# Patient Record
Sex: MALE | Race: WHITE
[De-identification: names, ages, dates, MRNs, and addresses within clinical notes are randomized per-mention and may not be internally consistent; named-entity substitution may affect disease eponyms.]

---

## 2017-06-13 NOTE — HEMODYNAMI
PATIENT:LELA ORTIZ                                    MEDICAL RECORD: [id number]
: 51                                            LOCATION:[facility]     
[id number]                                       ADMISSION DATE: 17
 
 
 Generatedon:201712:32
Patient name: LELA ORTIZ Patient #: [id number] Visit #: [id number] SSN: :
1955 Date of study: 2017
Page: Of
Hemodynamic Procedure Report
****************************
Patient Data
Patient Demographics
Procedure consent was obtained
First Name: LELA          Gender: Male
Last Name: DIANA             : 1955
Middle Initial: M           Age: 62 year(s)
Patient #: [id number]       Race: Unknown
Visit #: [id number]
Accession #:
[id number]
Additional ID: [id number]
Contact details
Address: [address] [place] [address]       Phone: [phone]
State: [place]
City: [facility]
Zip code: [address]
Past Medical History
Allergies: No known allergies
Admission
Admission Data
Admission Date: 2017   Admission Time: 7:15
Room #: D.2118
Lab Results
Lab Result Date: 2017  Lab Result Time: 4:35
Biochemistry
Name         Units    Result                Min      Max
BUN          mg/dl    19       --(----)*-   7        18
Creatinine   mg/dl    0.8      --(-*--)--   0.6      1.3
CBC
Name         Units    Result                Min      Max
Hematocrit   %        32.4     *-(----)--   42       54
Hemoglobin   g/dl     10.9     *-(----)--   13.5     17.5
Procedure
Procedure Types
Cath Procedure
Diagnostic Procedure
DROA
Procedure Description
Procedure Date
Procedure Date: 2017
Procedure Start Time: 12:20
Procedure End Time: 12:32
Procedure Staff
Name                            Function
Prabhjot Adames MD                Performing Physician
Ned Griffith RN                  Nurse
Kodak Patel RN                Nurse
Jamilah Alexander RT             Monitor
 
Procedure Medications
Medication           Administration Route Dosage
Oxygen               NC                   2 l/min
Hurricaine [place]     P.O.                 1 Sprays
Refer to Anesthesia
Notes for Sedation
Medications
Hemodynamics
Rest
Heart Rate: 94 (bpm)
Snapshots
Pre Cath      Intra         NCS           Post Cath
Vital Signs
Time     Heart  Resp   SPO2 NIBP (mmHg) Rhythm  Pain    Sedation
Rate   (ipm)  (%)                      Status  Level
(bpm)
12:06:21 102    19     97   162/87(121) NSR     0 (11)  10(A)
, No
pain
12:10:35 89     18     96   137/78(105) NSR     0 (11)  10(A)
, No
pain
12:14:51 89     18     96   147/81(111) NSR     0 (11)  10(A)
, No
pain
12:19:05 97     16     98   154/89(122) NSR     0 (11)  10(A)
, No
pain
12:23:25 74     17     98   135/68(104) NSR     0 (11)  9(A)
, No
pain
12:27:41 82     18     99   133/74(97)  NSR     0 (11)  8(A)
, No
pain
12:31:02 94     19     99   127/78(105) NSR     0 (11)  10(A)
, No
pain
Medications
Time     Medication  Route  Dose   Verified Delivered Reason    Notes  Effective
ness
by       by
12:21:44 Oxygen      NC     2      Prabhjot Marino    used for
l/min  Rosangela Griffith RN   procedure
12:21:54 Hurricaine  P.O.   1      Prabhjot Marino    Per
Spray              Sprays Rosangela Griffith RN   physician
12:21:58 Refer to                  Prabhjot Marino
Anesthesia                Rosangela Griffith RN
Notes for
Sedation
Medications
Procedure Log
Time     Note
11:49:27 Kodak Patel RN sent for patient. Start room use.
11:49:28 Time tracking: Regular hours
11:49:34 Plan of Care:Hemodynamics will remain stable., Cardiac
rhythm will remain stable., Comfort level will be
maintained., Respiratory function will remain
adequate., Patient/ family verbilizes understanding of
procedure., Procedure tolerated without complication.,
Recovers from procedure without complications..
 
12:01:53 Patient arrived from PCU to CCL 2. Patient remains on
bed/stretcher for procedure.
12:01:54 Warm blankets applied, and demetris hugger turned on for
patient comfort.
12:01:55 Correct patient and procedure confirmed by team.
12:01:56 Signed procedure consent form obtained from patient.
12:01:57 ECG and BP/O2 sat monitors applied to patient.
12:01:58 Full Disclosure recording started
12:05:11 Vital chart was started
12:05:16 Rhythm: sinus rhythm
12:06:27 H&P Date Dictated: 2017 Within 30 days and on
chart., H&P Addendum completed by physician on day of
procedure. (MUST COMPLETE FOR ALL OUTPATIENTS).
12:06:28 Pre-procedure instructions explained to patient.
12:06:29 Pre-op teaching completed and patient verbalized
understanding.
12:06:30 Family in patients room.
12:06:36 Patient NPO since Midnight.
12:07:30 Is patient on blood thinner?No
12:07:31 Patient diabetic? Yes.
12:07:32 If diabetic: On Metformin? No
12:07:45 Previous problem with sedation/anesthesia? No ?
12:07:47 Snore? Yes
12:07:48 Sleep apnea? No
12:07:49 Deviated septum? No
12:07:50 Opens mouth fully? Yes
12:07:50 Sticks out tongue? Yes
12:07:53 Airway obstruction? No ?
12:07:54 Dentures? No ?
12:07:59 Patient pain scale 0/10 ?.
12:08:07 IV patent on arrival in left hand with 0.9% NaCl at
KVO.
12:08:13 Lab results completed and on chart.
12:08:16 Alarms reviewed by R. N.
12:08:16 Sharps counted by scrub and verified by R.N.
12:11:28 Navi Pierre Echo Tech present for DORA.
12:11:49 Baseline sample Acquired.
12:19:26 Final Timeout: patient, procedure, and site verified
with staff and physician. All members of the team are
in agreement.
12:19:32 Physical assessment completed. ASA score P 2 - A
patient with mild systemic disease as per Prabhjot Adames MD.
12:19:37 Sedation plan: TIVA Versed, Fentanyl
12:20:05 Dr Angulo present and monitoring patient for TIVA.
12:21:44 Oxygen 2 l/min NC was administered by Ned Griffith RN;
used for procedure;
12:21:54 Hurricaine Spray 1 Sprays P.O. was administered by
Ned Griffith RN; Per physician;
12:21:58 Refer to Anesthesia Notes for Sedation Medications was
administered by Ned Griffith RN; ;
12:22:45 DORA started.
12:26:22 DORA completed.
12:26:54 Procedure ended.(Physican Out)
12:28:09 Post-procedure physical assessment completed. ASA
score P 2 - A patient with mild systemic disease as
per Prabhjot Adames MD.
12:28:11 Post procedure rhythm: unchanged.
12:28:13 Post procedure instruction explained to
patient.Patient verbalizes understanding.
 
12:28:14 Patient needs reinforcement of post procedure
teaching.
12:28:29 See physician's report for complete and final results.
12:32:19 Vital chart was stopped
12:32:22 Report given to PCU.
12:32:26 Patient transfered to PCU with Bed.
12:32:34 Procedure ended.
12:32:34 Full Disclosure recording stopped
12:32:38 End room use (Document Last)
Signature Audit [place]
Stage           Time        Signature      Unsigned
Intra-Procedure 2017   Jamilah
12:32:47 PM Counts RT(R)
Signatures
Monitor : Jamilah     Signature :
Counts RT               ______________________________
Date : ______________ Time :
______________
 
 
 
 
 
 
 
 
 
 
 
 
 
 
 
 
 
 
 
 
 
 
 
 
 
 
 
 
 
 
 
 
 
 
 
 
 
[id number] [place] [address]

## 2017-06-13 NOTE — NUR
IV FLUIDS OF NS INITIATED @ 125 CC/HR VIA PUMP PER ORDER. FAMILY IN ROOM.
WAITING ON INSULIN FROM PHARMACY TO COVER BLOOD SUGAR. WILL CALL NOW.

## 2017-06-13 NOTE — NUR
GLUCOMETER READING 421. STAT GLUCOSE LEVEL PUT IN COMPUTER. WILL SPEAK WITH
DR. CRUZ ABOUT DOING I&O CATH FOR UA.

## 2017-06-13 NOTE — NUR
IV TO LEFT AC WAS OUT WITH TIP INTACT. RESITED TO LEFT WRIST WITH 22 GA X1
STICK PER [facility]. ORAL MEDS ADMINISTERED. FSBS 395 SO 16 UNITS REGULAR
INSULIN SUBQ TO LEFT ARM. LOVENIX SUBQ TO LLQ. DAUGHTER AT BEDSIDE. CALL LIGHT
IN REACH. BED ALARM ON.

## 2017-06-13 NOTE — TEE
PATIENT:LELA ORTIZ                     MEDICAL RECORD: [id number]
                                               LOCATION:D.     D.211
AGE OF PATIENT: 62                             ADMISSION DATE: 06/13/17
SEX: M   
 
REFERRING PHYSICIAN:                                                             
 
INTERPRETING PHYSICIAN: CARI ADAMES MD             

 
 
                         TRANSESOPHAGEAL ECHOCARDIOGRAM
                 DORA CHARGE  Y
                INDICATIONS: STAPH IN BLOOD, ASSESS   
                             FOR VEGATAIONN           
             PREMEDICATIONS:                               
 
PATIENT'S RESPONSE PROCEDURE                          
 
                     DOPPLER MEASUREMENTS:
            LVIT            LA           PA           RA     
            LVOT 133      RVOT      Asc. Ao 128 
AV Gradient Peak 6.6   AV Mean 2.7  AV Area 3.1 
MV Gradient Peak 4.5   MV Mean 1.6  MV Area     
 INTERPRETATION:                          
 
 
 
 
               Doppler:                          
 
                   2-D: VEGATATION NOTED ON      
                        TRICUSPID VALVE          
     COLOR FLOW DOPPLER                          
 
   NORMAL SALINE STUDY:                     
 
          MISCELLANOUS:                          
 
             DIAGNOSIS:                          
 
                  PLAN:                          
 
           Cardiologist:1          Dr. Adames                
   Cardiac Sonographer: Yanet BROWN              
              COMMENTS:                                              
                                                                                     
 
 
TRANSESOPHAGEAL ECHOCARDIOGRAPHIC EVALUATION OF VALVULAR HEART DISEASE 
 
FINDINGS:  
1. Left ventricular chamber size is within normal limits.  Left ventricular systolic
function is normal.  Overall ejection fraction is estimated at 50 percent.
2. Left atrium, right atrium, and right ventricular chamber sizes are mildly dilated.
 The left atrium measures 4.1 centimeters.  
3. Valvular structures:  There is a definite vegetation on the tricuspid valve
compatible with endocarditis.  The remaining valvular structures have normal
 
 
 
TRANSESOPHAGEAL ECHOCARDIOGRAM REPORT                    [id number]    LELA ORTIZ   
 
 
structure and motion.
4. Doppler interrogation reveals moderate tricuspid regurgitation; no other valvular
insufficiency or stenosis.  
5. No evidence of pericardial effusion or left ventricular thrombus.   
 
OVERALL IMPRESSION:  Tricuspid valve endocarditis.  
                                           
                                           CARI ADAMES MD             
 
 
 
 
 
 
 
 
 
 
 
 
 
 
 
 
 
 
 
 
 
 
 
 
 
 
 
 
 
 
 
 
 
 
 
 
 
 
CC:                                                             [id number]
DICTATION DATE: 06/27/17 1219     : TC  07/01/17 1217      DIS IN  
                                                                      06/29/17
[id number] [place] [address]

## 2017-06-13 NOTE — HEMODYNAMI
PATIENT:LELA ORTIZ                                    MEDICAL RECORD: [id number]
: 51                                            LOCATION:[facility]     D.2118
[id number]                                       ADMISSION DATE: 17
 
 
 Generatedon:201711:08
Patient name: LELA ORTIZ Patient #: [id number] Visit #: [id number] SSN: : 
1955 Date of
study: 2017
Page: Of
Hemodynamic Procedure Report
****************************
Patient Data
Patient Demographics
Procedure consent was obtained
First Name: LELA          Gender: Male
Last Name: DIANA             : 1955
Middle Initial: M           Age: 62 year(s)
Patient #: [id number]       Race: Unknown
Visit #: [id number]
Accession #:
[id number]
Additional ID: [id number]
Contact details
Address: [address] [place] [address]       Phone: [phone]
State: [place]
City: [facility]
Zip code: [address]
Past Medical History
Allergies: No known allergies
Admission
Admission Data
Admission Date: 2017   Admission Time: 7:15
Room #: D.2118
Lab Results
Lab Result Date: 2017  Lab Result Time: 4:35
Biochemistry
Name         Units    Result                Min      Max
BUN          mg/dl    19       --(----)*-   7        18
Creatinine   mg/dl    0.8      --(-*--)--   0.6      1.3
CBC
Name         Units    Result                Min      Max
Hematocrit   %        32.4     *-(----)--   42       54
Hemoglobin   g/dl     10.9     *-(----)--   13.5     17.5
Procedure
Procedure Types
Cath Procedure
Peripheral Cath Diagnostic Procedure
Cath Peripheral
Aorto-Femoral-Run-Off
Peripheral vascular Intervention
Angioplasty
Angioplasty Tib-Per Initial
Procedure Description
Procedure Date
Procedure Date: 2017
Procedure Start Time: 10:26
Procedure End Time: 11:07
Procedure Staff
 
Name                            Function
Ned Griffith RN                  Nurse
Prabhjot Adames MD                Performing Physician
Everardo Garcia RT                  Monitor
Michaela Escamilla RT               Scrub
Jamilah Alexander RT             Monitor
Procedure Data
Cath Procedure
Fluoroscopy
Diagnostic fluoroscopy      Total fluoroscopy Time:
time: 11.2 min              11.2 min
Diagnostic fluoroscopy      Total fluoroscopy dose: 147
dose: 147 mGy               mGy
Contrast Material
Contrast Material Type                       Amount (ml)
Isovue 300                                   144
Entry Location
Entry     Primary  Successful  Side  Size  Upsize 1   Upsize Entry    Closure Prajapati
ccessful  Closure
Location                             (Fr)  (Fr)       2 (Fr) Remarks  Device    
          Remarks
Femoral                        Left  5 Fr  6 Fr       6 Fr            Exoseal
artery                                     Mid-Length Short
Estimated blood loss: 10 ml
Diagnostic catheters
Device Type               Used For           End Catheter
Placement
Cordis Tempo 5Fr UF       Procedure
catheter
Diagnostic Infinity 5Fr   Procedure
MPA-2 catheter
Cordis Aqua 5Fr Cortez Procedure
125cm catheter
Procedure Complications
No complications
Procedure Medications
Medication           Administration Route Dosage
Oxygen               NC                   2 l/min
Lidocaine 2%         added to field       20
Heparin Flush Bag    added to field       2 bags
(1000units/500ml NS)
0.9% NaCl            I.V.                 300
Versed               I.V.                 1 mg
Fentanyl             I.V.                 50 mcg
Versed               I.V.                 1 mg
Fentanyl             I.V.                 50 mcg
Heparin Bolus        I.V.                 5000 units
Fentanyl             I.V.                 25 mcg
Hemodynamics
Rest
HGB: 10.9 (g/dl) Heart Rate: 98 (bpm)
Snapshots
Pre Cath      Intra         NCS           Post Cath
Vital Signs
Time     Heart  Resp   SPO2 etCO2   CI1fiho NIBP (mmHg) Rhythm  Pain    Sedation
Rate   (ipm)  (%)  (mmHg)  (mmHg)                      Status  Level
(bpm)
10:09:10 97     17     98   0       0       158/95(131) NSR     0 (11)  10(A)
, No
pain
 
10:13:26 90     17     97   0       0       137/81(105) NSR     0 (11)  10(A)
, No
pain
10:17:42 91     16     98   0       0       137/82(106) NSR     0 (11)  10(A)
, No
pain
10:21:56 91     16     97   0       0       145/83(110) NSR     0 (11)  10(A)
, No
pain
10:26:16 92     19     97   0       0       146/83(108) NSR     0 (11)  9(A)
, No
pain
10:30:32 97     15     96   0       0       157/89(120) NSR     0 (11)  9(A)
, No
pain
10:34:50 97     16     96   0       0       154/88(119) NSR     0 (11)  9(A)
, No
pain
10:39:06 96     18     96   0       0       150/86(109) NSR     0 (11)  9(A)
, No
pain
10:43:24 98     17     97   0       0       157/89(123) NSR     0 (11)  9(A)
, No
pain
10:47:42 96     17     97   0       0       151/91(118) NSR     0 (11)  9(A)
, No
pain
10:52:02 93     17     96   0       0       153/88(118) NSR     0 (11)  9(A)
, No
pain
10:56:23 87     17     97   0       0       157/88(119) NSR     0 (11)  9(A)
, No
pain
11:00:40 95     18     97   0       0       154/91(114) NSR     0 (11)  9(A)
, No
pain
Medications
Time     Medication       Route  Dose  Verified Delivered Reason          Notes 
   Effectiveness
by       by
10:13:49 Oxygen           NC     2     Prabhjot  Buffie    used for
l/min Rosangela Griffith RN   procedure
10:13:56 Lidocaine 2%     added  20ml  Prabhjot  Prabhjot   for local
to     vial  Rosangela Adames MD  anesthetic
field
10:14:02 Heparin Flush    added  2     Prabhjot  Prabhjot   used for
Bag              to     bags  Rosangela Adames MD  procedure
(1000units/500ml field
NS)
10:14:12 0.9% NaCl        I.V.   300   Prabhjot Solisie    Per physician
ml/hr Rosangela Griffith RN
x 500
ml
total
10:25:58 Versed           I.V.   1 mg  Prabhjot Solisie    for sedation
Rosangela Griffith RN
10:26:03 Fentanyl         I.V.   50    Prabhjot  Buffie    for sedation
mcg   Rosangela Griffith RN
10:30:11 Versed           I.V.   1 mg  Prabhjot Marino    for sedation
Rosangela Griffith RN
 
10:30:16 Fentanyl         I.V.   50    Prabhjot  Buffie    for sedation
mcg   Rosangela Griffith RN
10:33:27 Heparin Bolus    I.V.   5000  Prabhjotpatricia Solisie    for             verifi
ed
units Rosangela Griffith RN   anticoagulation with dr adames
10:52:06 Fentanyl         I.V.   25    Prabhjot  Ned    for sedation
francine Griffith RN
Procedure Log
Time     Note
9:37:33  Ned Griffith RN sent for patient. Start room use.
9:37:34  Time tracking: Regular hours
9:37:39  Plan of Care:Hemodynamics will remain stable., Cardiac
rhythm will remain stable., Comfort level will be
maintained., Respiratory function will remain
adequate., Patient/ family verbilizes understanding of
procedure., Procedure tolerated without complication.,
Recovers from procedure without complications..
9:49:04  Acist Syringe opened to sterile field.
9:49:05  Medline Cath Pack opened to sterile field.
9:49:05  Bag Decanter opened to sterile field.
9:49:06  Terumo 5Fr Tillson Sheath opened to sterile field.
9:49:07  Acist Hand Control opened to sterile field.
9:49:08  Acist Manifold opened to sterile field.
9:49:10  Tegaderm 4 x 4 opened to sterile field.
9:56:30  Patient received from Med/Surg to CCL 1 Alert and
oriented. Tansferred to table in Supine position.
9:56:31  Correct patient and procedure confirmed by team.
9:56:31  Warm blankets applied, and demetris hugger turned on for
patient comfort.
9:56:33  Signed procedure consent form obtained from patient.
9:56:34  Full Disclosure recording started
9:56:34  ECG and BP/O2 sat monitors applied to patient.
10:07:58 Vital chart was started
10:08:01 Baseline sample Acquired.
10:08:24 H&P Date Dictated: 2017 Within 30 days and on
chart..
10:08:28 Pre-procedure instructions explained to patient.
10:08:31 Family unavailable.
10:08:33 Patient NPO since Midnight.
10:08:44 Patient allergic to No known allergies
10:08:47 Is the patient allergic to Iodine/contrast media? No.
10:08:49 Is patient on blood thinner?Yes
10:08:53 **ACC** The patient was administered the following
blood thiners within the last 24 hours: **ACC**Plavix
10:08:56 Patient diabetic? Yes.
10:08:57 If diabetic: On Metformin? No
10:09:03 Snore? No
10:09:05 Sleep apnea? No
10:09:11 Dentures? No ?
10:09:28 Patient pain scale 0/10 ?.
10:09:37 IV patent on arrival in left hand with 0.9% NaCl at
KVO.
10:13:49 Oxygen 2 l/min NC was administered by Ned Griffith RN;
used for procedure;
10:13:56 Lidocaine 2% 20ml vial added to field was administered
by Prabhjot Adames MD; for local anesthetic;
10:14:02 Heparin Flush Bag (1000units/500ml NS) 2 bags added to
field was administered by Prabhjot Admaes MD; used for
procedure;
 
10:14:12 0.9% NaCl 300 ml/hr x 500 ml total I.V. was
administered by Ned Griffith RN; Per physician;
10::33 Lab Result : Hemoglobin 10.9 g/dl
10::33 Lab Result : Hematocrit 32.4 %
10:: Lab Result : BUN 19 mg/dl
10::33 Lab Result : Creatinine 0.8 mg/dl
10:16:39 Lab results completed and on chart.
10:16:49 Bilateral groins area was prepped with chlora-prep and
draped in sterile fashion
10:16:50 Sharps counted by scrub and verified by R.N.
10:16:50 Alarms reviewed by R. N.
10:17:03 Rhythm: sinus tachycardia
10:17:42 St Rm 260cm J .035 wire opened to sterile field.
10::16 --------ALL STOP TIME OUT------
10::16 Physician arrived
10::17 Final Timeout: patient, procedure, and site verified
with staff and physician. All members of the team are
in agreement.
10:22:18 Bilateral groins site verified by team.
10::22 Physical assessment completed. ASA score P 3 - A
patient with severe systemic disease as per Prabhjot Adames MD.
10:22:25 Sedation plan: IV Moderate Sedation Versed, Fentanyl
10:23:10 Zero performed for pressure channel P1
10:25:58 Versed 1 mg I.V. was administered by Ned Griffith RN;
for sedation;
10:26:03 Fentanyl 50 mcg I.V. was administered by Ned Griffith
RN; for sedation;
10:26:24 Procedure started.
10::27 Local anesthetic to left femerol artery with Lidocaine
2% by Prabhjot Adames MD.**INITIAL ACCESS ONLY**
10:27:00 A 5 Fr sheath was inserted into the Left Femoral
artery
10:27:47 A Cordis Tempo 5Fr UF catheter was advanced over the
wire and used for Procedure.
10:27:51 Abdominal angiogram w/ runoff was performed.
10:28:04 Left leg runoff performed.
10:28:35 Right leg runoff performed.
10:28:56 Catheter removed.
10:29:12 glide wire advanced around the horn.
10:29:33 Terumo TORQUE DEVICE PLASTIC .038 opened to sterile
field.
10:29:57 A Diagnostic Infinity 5Fr MPA-2 catheter was advanced
over the wire and used for Procedure.
10:30:11 Versed 1 mg I.V. was administered by Ned Griffith RN;
for sedation;
10:30:16 Fentanyl 50 mcg I.V. was administered by Ned Griffith
RN; for sedation;
10:30:39 Right leg runoff performed.
10:32:12 Catheter removed.
10:32:26 Terumo 6Fr Tillson Destination Sheath opened to
sterile field.
10:33:01 Sheath upsized to a 6 Fr Mid-Length.
10:33:27 Heparin Bolus 5000 units I.V. was administered by
Ned Griffith RN; for anticoagulation; verified with dr adames
10:33:35 Wire removed.
10:33:49 [place] Sci Choice PT Extra Support J 300cm .014 gu
opened to sterile field.
10:35:05 Terumo 6Fr Tillson Sheath opened to sterile field.
 
10:35:06 Merit BasixCompak Inflation Kit opened to sterile
field.
10:35:37 pt ex support wire advanced.
10:41:08 The [place] Sci Kittson 2.5 X 20 balloon was advanced
and then removed because of failure to cross lesion
10:41:52 A Cordis Aqua 5Fr Cortez 125cm catheter was
advanced over the wire and used for Procedure.
10:43:03 Wire advanced across lesion.
10:43:44 Wire removed.
10:43:50 [place] Sci Choice PT Extra Support J 300cm .014 gu
opened to sterile field.
10:45:17 pt ex support wire advanced.
10:45:20 Catheter removed.
10:47:13 Wire removed. damaged.
10:47:17 [place] Sci Choice PT Extra Support J 300cm .014 gu
opened to sterile field.
10:47:27 pt ex support wire advanced.
10:48:25 Wire advanced across lesion.
10:48:26 Inflation number: 1 A [place] Sci Kittson 2.5 X 20
balloon was prepped and advanced across the Distal
Posterior Tibial, Right, then inflated to 9 MOLLY for
0:10 (min:sec).
10:48:37 Inflation number: 2 The [place] Sci Kittson 2.5 X 20
balloon was reinflated across the Distal Posterior
Tibial, Right, to 9 MOLLY for 0:10 (min:sec).
10:48:44 Inflation number: 3 The [place] Sci Kittson 2.5 X 20
balloon was reinflated across the Distal Posterior
Tibial, Right, to 9 MOLLY for 0:10 (min:sec).
10:49:13 Balloon removed over the wire.
10:50:32 fsbs performed due to perspiration, fsbs 84 mg/dl.
10:50:49 Inflation number: 4 A Saber 2.5 x 8 x 150 balloon was
prepped and advanced across the Distal Posterior
Tibial, Right, then inflated to 11 MOLLY for 0:30
(min:sec).
10:52:06 Fentanyl 25 mcg I.V. was administered by Ned Griffith RN; for sedation;
10:52:11 Inflation number: 5 The Saber 2.5 x 8 x 150 balloon
was reinflated across the Distal Posterior Tibial,
Right, to 11 MOLLY for 0:26 (min:sec).
10:52:56 Wire removed.
10:52:56 Balloon removed over the wire.
10:53:17 Sheath upsized to a 6 Fr Short.
10:53:24 Cordis 6Fr Exoseal opened to sterile field.
10:53:34 Sheath removed intact; hemostasis achieved with
Exoseal to the Left Femoral artery.
10:53:36 Procedure ended.(Physican Out)
10:54:36 Fluoroscopy time 11.20 minutes.
10:54:41 Fluoroscopy dose: 147 mGy
10:54:41 Flurop Dose total: 147
10:55:05 Contrast amount:Isovue 300 144ml.
10:55:07 Sharps counted by scrub and verified by R.N.
11:00:53 Terumo ANGLE 260cm glide wire opened to sterile field.
11:01:38 Insertion/operative site no bleeding no hematoma.
11:01:42 Post-op/insertion site Right Femoral artery dressed
using a 4 x 4 and Tegaderm.
11:01:46 Post right femoral artery:stable, clean and dry
11:01:48 Post Procedure Pulses reassessed and unchanged
11:01:52 Post-procedure physical assessment completed. ASA
score P 2 - A patient with mild systemic disease as
per Prabhjot Adames MD.
 
11:01:54 Post procedure rhythm: unchanged.
11:01:56 Estimated blood loss: 10 ml
11:01:58 Patient needs reinforcement of post procedure
teaching.
11:01:58 Post procedure instruction explained to
patient.Patient verbalizes understanding.
11:02:11 Procedure type changed to Cath procedure, Peripheral
Cath Diagnostic Procedure, Cath Peripheral,
Aorto-Femoral-Run-Off, Peripheral vascular
Intervention, Angioplasty, Angioplasty Tib-Per Initial
11:03:37 Procedure Complication : No complications
11:03:39 See physician's report for complete and final results.
11:03:49 Procedure and supply charges have been captured,
reviewed, submitted and are correct.
11:03:51 Vital chart was stopped
11:04:03 Report given to Pre/Post Procedure Room.
11:04:09 Patient transfered to Pre/Post Procedure Room with
Bed.
11:07:18 Full Disclosure recording stopped
11:07:18 Procedure ended.
11:07:22 End room use (Document Last)
Intervention Summary
Intervention Notes
Time     ActionType  Lesion and  Equipment Action#  Pressure  Duration
Attributes  Used
10:41:08 Discard                 [place]
Balloon                 Sci
Kittson
2.5 X 20
balloon
10:48:26 Inflate     Distal      Del[place]    1        9         00:10
balloon     Posterior   Sci
Tibial,     Kittson
Right       2.5 X 20
balloon
10:48:37 Reinflate   Distal      Delaware    2        9         00:10
balloon     Posterior   Sci
Tibial,     Kittson
Right       2.5 X 20
balloon
10:48:44 Reinflate   Distal      [place]    3        9         00:10
balloon     Posterior   Sci
Tibial,     Kittson
Right       2.5 X 20
balloon
10:50:49 Inflate     Distal      Saber 2.5 4        11        00:30
balloon     Posterior   x 8 x 150
Tibial,     balloon
Right
10:52:11 Reinflate   Distal      Saber 2.5 5        11        00:26
balloon     Posterior   x 8 x 150
Tibial,     balloon
Right
Device Usage
Item Name   Manufacture  Quantity  Catalog Number  Hospital Part    Current Mini
mal Lot# /
Charge   Number  Stock   Stock   Serial#
Code
Acist       Acist        1         15431           952447   636594  526896  20
Syringe     Medical
 
Systems Inc
Bag         Microtek     1         S           655121   64851   096316  5
DecFuturaMedia    Medical Inc.
Medline     Cardinal     1         [id number]       960825   35546   007877  5
Cath Pack   Baydin
Terumo 5Fr  Terumo       1         [id number]          065990   794543  392035  40
Tillson
Sheath
Acist Hand  Acist        1         18779           563524   793726  766233  5
Control     Medical
Systems Inc
Acist       Acist        1         53178           488436   859722  508984  5
Manifold    Medical
Systems Inc
Tegaderm 4  3M           1         1626W           508632   857469  881494  5
x 4
St Rm     St Rm      1         209071          733924   085233  107837  30
260cm J
.035 wire
Cordis      Cardinal     1         178626G9        171287   353779  480784  10
Tempo 5Fr   Health
UF catheter
Terumo      [place]       1         TD01            949211   658707  367802  5
TORQUE      Scientific
DEVICE
PLASTIC
.038
Diagnostic  Cardinal     1         [id number]         135721   036570  678159  5
Infinity    Health
5Fr MPA-2
catheter
Terumo 6Fr  Terumo       1         RSR01           744971   27669   412584  5
Tillson
Destination
Sheath
[place] Sci  [place]       3         [id number]   723292   454462  781541  5
Choice PT   Scientific
Extra
Support J
300cm .014
gu
Terumo 6Fr  Terumo       1         QRA732          500362   524998  990957  40
Tillson
Sheath
Merit       Merit        1         KV0152          035745   727900  395322  15
BasixCompak Medical
Inflation
Kit
[place] Sci  [place]       1         [id number]  108926   131665  689585  1   
    [id number]
[employer]    Scientific
2.5 X 20
balloon
Cordis Aqua Cardinal     1         [id number]         688944   987922  465753  5
5Fr         Health
Cortez
125cm
catheter
Saber 2.5 x Cardinal     1         [id number]       276013   502146  214289  5
8 x 150     Health
 
balloon
Cordis 6Fr  Cardinal     1                    375965   286863  830319  10
Exoseal     Health
Terumo      Terumo       1         VZ8622          306572   246168  277626  5
ANGLE 260cm
glide wire
Signature Audit [place]
Stage           Time        Signature      Unsigned
Intra-Procedure 2017   Jamilah
11:08:30 AM Counts RT(R)
Signatures
Monitor : Everardo Garcia RT Signature :
______________________________
Date : ______________ Time :
______________
Monitor : Jamilah     Signature :
Counts RT               ______________________________
Date : ______________ Time :
______________
 
 
 
 
 
 
 
 
 
 
 
 
 
 
 
 
 
 
 
 
 
 
 
 
 
 
 
 
 
 
 
 
 
 
 
 
[address] [place] [address]

## 2017-06-13 NOTE — NUR
WILL INFORM FAMILY THAT UA IS NORMAL. NO OTHER CHANGES IN INITIAL ASSESSMENT.
CALL LIGHT IN REACH. SCDs TO BLE. BED ALARM ON. WILL CONTINUE WITH PLAN OF
CARE.

## 2017-06-13 NOTE — NUR
PT CONFUSED AT TIMES WITH DAUGHTERS TIMES TWO IN THE ROOM WITH PT AT THIS
TIME. PT HERE FOR HYPOGLYCEMIA AND DEHYDRATION FOR THIS VISIT. IV TO LEFT AC
PATENT AND INTACT. SRX2 BED AT LOWEST SETTING CALL LIGHT WITHIN REACH WILL
CONTINUE TO MONITOR

## 2017-06-13 NOTE — NUR
RECEIVED TO ROOM 2209 FROM ER VIA GURNEY. TRANSFERRED TO BED PER CNAs.
SATURATED CLOTHES TAKEN OFF AND GOWN PLACED ON PATIENT AFTER CLEANING HIM UP.
ORIENTED TO ROOM AND CALL LIGHT SYSTEM FAMILY IN ROOM. CALL LIGHT IN REACH.

## 2017-06-13 NOTE — NUR
16 FR COCHRAN CATH INSERTED USING STERILE TECHNIQUE WITH 1125 CC YELLOW URINE
RETURN. ALL CONTENTS OF KIT USED. STERILE SAMPLE OBTAINED AND SENT TO LAB FOR
UA.

## 2017-06-14 NOTE — NUR
ASSESSMENT COMPLETED. AM MEDS ADMINISTERED. FSBS 184 SO 8 UNITS REGULAR
INSULIN SUBQ TO LLQ ABDOMEN. BED ALARM ON. SCDs TO BLE. DAUGHTER AT BEDSIDE.
WAS ABLE TO SPEAK WITH DR. ADEN THIS MORNING WHEN HE CAME TO SEE PATIENT. CALL
LIGHT IN REACH. WILL CONTINUE WITH PLAN OF CARE.

## 2017-06-14 NOTE — NUR
NO CHANGES IN INITIAL ASSESSMENT. SCDs STILL OFF PER DR. ADEN'S ORDER. BED
ALARM ON. DAUGHTER AT BEDSIDE. CALL LIGHT IN REACH. WILL CONTINUE WITH PLAN OF
CARE.

## 2017-06-14 NOTE — NUR
* Is the patient Alert and Oriented? Yes  0
* How many steps to enter\exit or inside your home? 10  0
* PCP RADHA  0
* Pharmacy [facility] [address]  0
* Preadmission Environment Group Home  0
* ADLs Independent  0
* Equipment Glucometer  0
* Community resources currently utilized None  0
* Additional services required to return to the preadmission environment? Yes
0
* Can the patient safely return to the preadmission environment? Yes  0
* Has this patient been hospitalized within the prior 30 days at any hospital?
No  0
    Grand Total:  0
 
Patient Name: LELA ORTIZ Admission Status: ER
Accout number: [id number] Admission Date: 2017
: 1955 Admission Diagnosis:
Attending: SUMA Current LOS: 1
 
Anticipated DC Date:
 
Planned Disposition:
Primary Insurance: BLUE CROSS OUT OF STATE
 
 
Discharge Planning Comments:
CM met with patients daughter (Shelley) while the patient was sleeping.
Daughter states that her dad is very independent and works 50 hours a week.
She states that he works out of town during the week. He is a diabetic and has
a glucometer at home. He has stairs to enter his house with a rail. Daughter
states that he is safe to return home & either her or her sister (Brooke) will
be taking him home. CM will continue to follow and assist as needed.
 
 
PCP: Radha
Pharmacy: [facility] [address]
Kevin Rosa ([phone])

## 2017-06-14 NOTE — NUR
IN BED WITH EYES CLOSED. RESP EVEN AND UNLABORED. DAUGHTER AT BEDSIDE. CALL
LIGHT IN REACH. DR. BHATIA ON FLOOR AND WILL SEE PATIENT SOON.

## 2017-06-14 NOTE — NUR
PT CONFUSED AT TIMES PT HERE FOR HYPOGLYCEMIA AND DEHYDRATION FOR THIS VISIT.
PT HERE FOR IV FLUIDS AND GLUCOSE CONTROL. IV TO RIGHT HAND PATENT AND INTACT
AT THIS TIME SRX2 BED AT LOWEST SETTING CALL LIGHT WITHIN REACH FAMILY AT
BEDSIDE

## 2017-06-14 NOTE — NUR
HS MEDICATIONS GIVEN TO INCLUDE DILAUDID 0.5 MG IVP PER PRN ORDER FOR PAIN, AS
PT IS MOANING AND FACIAL GRIMACING...PAIN MED REQUESTED BY DAUGHTER. WILL
MONITOR FOR EFFECTIVENESS. SIDE RAILS UP X2 FOR SAFETY.

## 2017-06-14 NOTE — NUR
BEDSIDE REPORT RECEIVED AND CARE OF PT ASSUMED.  PT LYING IN SEMI MARRERO'S
POSITION WITH EYES CLOSED AND UNLABORED BREATHING.  O2 IN USE VIA NC AT 2L.
IV IN LEFT WRIST PATENT WITH NS INFUSING  ML / HR.  COCHRAN CATHETER
DRAINING TO GRAVITY WITY YELLOW URINE IN COLLECTION BAG.  TELEMETRY IN USE AND
PT READING [address] AT THIS TIME.  DAUGHTER IS AT BEDSIDE. WILL MONITOR CLOSLEY
FOR NEEDS.

## 2017-06-15 NOTE — NUR
LETHARGIC, ABLE TO TAKE MEDS, DOESN'T OPEN EYES, ABLE TO RESPOND TO REQUEST TO
OPEN MOUTH, TRYS TO SQUEEZE HANDS, BED LOWEST POSITON, CALL LIGHT IN REACH,
WILL CONTINUE TO MONITOR

## 2017-06-15 NOTE — NUR
GAVE DILAUDID 0.5 MG IVP PER PRN ORDER FOR PAIN, AS PT MOANING AND FACIAL
GRIMACING. WILL MONITOR FOR EFFECTIVENESS.

## 2017-06-15 NOTE — NUR
Patient unable to complete MRI screening sheet.
Daughter not in room nor available to complete MRI screening sheet up until
1845.
Informed Marina, nurse, will try again 6-16-17 to get MRI completed.

## 2017-06-16 NOTE — NUR
ASSESSED AT THE BEGINNING OF THE SHIFT. PT IS LETHARGIC BUT SEEMS TO MAKE
SENSE AND UNDERSTAND WHAT HE IS TOLD. HIS DAUGHTER IS WAS WITH HIM UNTIL ABOUT
2230. HE ACTS AS IF HE IS IN PLACE EVEN TO TOUCH HIM ANYWHERE. AT HS HE WAS
GIVEN PAIN MEDS TO HELP HIM REST. HE HAS A WOUND ON HIS GREAT TOE AND IT IS
DRESSED. TELEMETRY IS IN PLACE AND THERE IS A COCHRAN CATH. WE ARE ASSISTING HIM
WITH TURNING AND REPOSITIONING FOR COMFORT. THE BED IS LOW, RAILS UP X'S 2 AND
CALL LIGHT AT HAND. HE JUST FINISHED CALLING OUT FOR NURSING TO GET A DRINK OF
WATER AND WAS REORIENTED TO THE CALL LIGHT.

## 2017-06-16 NOTE — NUR
RECIEVED PT DURING WALKING ROUNDS. PT RESTING IN BED WITH NO COMPLAINTS OF
PAIN OR DISCOMFORT WHEN UNTOUCHED. PT DOES EXPERIENCE PAIN WHEN TOUCHED. PT
REQUESTED TO HAVE WATER AND WAS ABLE TO HOLD THE CUP AND DRINK HIMSELF.
ASSESSMENT DONE PER FLOWSHEET. BED IN LOW POSITION AND CALL LIGHT WITHIN
REACH. WILL CONTINUE TO MONITOR.

## 2017-06-16 NOTE — NUR
Nutrition follow-up:
Diet: ADA consistent CHO
PO intake poor at this time due to lethary.
Labs reviewed
Increased temperature
Wt: 172#
RDN will order Glucerna Shake with meals.  If pts po intake does not improve
within 24-48 hours will need to consider starting nutrition support. RDN
recommends NGT placement and TF started.
RDN following.

## 2017-06-17 NOTE — NUR
PT IS AWAKE ASKING FOR WATER. ASSISTED WITH LARGE GLASS OF WATER AND HE DID
WELL. STILL HAVING PROBLEMS WITH BEING ABLE TO  AND DRINK HIS OWN
WATER. THE BED IS LOW, RAILS UP X'S 2 WITH THE CALL LIGHT AT HAND.

## 2017-06-17 NOTE — NUR
PT AOX4 RESP EVEN AND NONLABORED PT DENIES NEEDS AT THIS TIME IV TO LEFT WRIST
PATENT AND INTACT SRX2 BED AT LOWEST SETTING CALL LIGHT WITHIN REACH WILL
CONTINUE TO MONITOR

## 2017-06-17 NOTE — NUR
resting quietly at present. on back. bs 160-8 un humulin reg given left arm.
no complaints at present. call light in reach

## 2017-06-18 NOTE — NUR
SHIFT ASSESSMENT COMPLETED. MEDICATION GIVEN PER ORDER. PT RECIEVED 8 UNITS
INSULIN PER SLIDING SCALE FOR QXSK=703. NO NEEDS ARE VOICED. WILL MONITOR.
SIDE RAILS X 2. BED LOW. BED ALARM ON. CALL LIGHT IN REACH.

## 2017-06-18 NOTE — NUR
RECIEVED SHIFT REPORT. PT IS LYING IN BED. ALERT AND ORIENTED AND ABLE TO
VERBALIZE NEEDS. IV IS PATENT AND FLUIDS ARE RUNNING PER ORDER. COCHRAN IS
DRAINING URINE BY GRAVITY. PT DENIES ANY PAIN AT THIS TIME. ISOLATION
PRECAUTIONS IN PLACE. NO NEEDS ARE VERBALIZED AT THIS TIME. WILL CONTINUE TO
MONITOR. SIDE RAILS ARE UP X 2. BED IS IN LOWEST POSITION. BED ALARM IS ON
FOR SAFETY. CALL LIGHT IS WITHIN REACH.

## 2017-06-18 NOTE — NUR
CURRENT FSBS 187. 8 UNITS OF INSULIN ADMINISTERED PER SS. LUNCH TRAY AT
BEDSIDE. BED LOW, CALL LIGHT IN REACH, MEAL SET UP PROVIDED. CPOC.

## 2017-06-18 NOTE — NUR
SPOKE WITH BONNIE FROM MICROBIOLOGY. STATED PT NEEDED TO BE PLACED IN
TEMPORARY CONTACT ISOLATION TO RULE OUT MRSA IN THE BLOOD. PRECAUTION SIGNS,
GOWNS, AND GLOVES PLACED OUTSIDE OF PT'S ROOM.

## 2017-06-18 NOTE — NUR
PATIENT IS ALERT. EATING LUNCH. NURSING STUDENT AT BEDSIDE ASSISTING PATIENT.
NO SIGNS OF DISTRESS NOTED. BED IN LOWEST POSITION, CALL LIGHT IN REACH. BED
RIALS UP X'S 2.

## 2017-06-18 NOTE — NUR
PT REC'D FROM [facility]. RESTING IN BED WATCHING TV. AAOX4. NO COMPLAINTS OF
PAIN. PIV TO L HAND FREE OF REDNESS AND SWELLING. LUNG SOUNDS CLEAR AND EQUAL
BILAT. BOWEL SOUNDS ACTIVE X4 QUADRANTS. +2 PITTING EDEMA FROM FEET TO KNEES
BILAT. OPEN SORE TO R GREAT TOE WITHOUT DRAINAGE, ODOR, AND WOUND BED DARK
RED/BROWN FROM SCABS. TOE ITSELF IS PINK WITH BRISK CAP REFILL. +2 PEDAL
PULSES BILAT. NO SCD'S AT THIS TIME UNTIL CTA IS READ AND CARDIOLOGY'S INPUT
IS GIVEN IF THE PT CAN HAVE THEM. BED LOW, CALL LIGHT IN REACH, DENIES NEEDS.
CPOC.

## 2017-06-18 NOTE — NUR
MORNING MEDS PASSED AT THIS TIME. PT TAKES PO MEDS ONE AT A TIME. PT PUT IN
TEMPORARY ISOLATION UNTIL BLOOD CX COME BACK PER BRODY FROM MICROBIOLOGY. PT
TURNED TO L SIDE. COCHRAN CATHETER READJUSTED TO REMOVE LOOPS AND KINKS. BED
LOW, CALL LIGHT IN REACH, DENIES NEEDS. CPOC.

## 2017-06-19 NOTE — NUR
CURRENT FSBS 251. 16 UNITS OF INSULIN ADMINISTERED PER SS. PT REPOSITIONED UP
IN BED AND ARMS ELEVATED. DAUGHTER AT BEDSIDE. UPDATE PROVIDED. BED LOW, CALL
LIGHT IN REACH, DENIES NEEDS. CPOC.

## 2017-06-19 NOTE — NUR
PT REC'D FROM [facility]. RESTING IN BED WITH EYES CLOSED. NO SIGNS OF
DISTRESS. RESP EVEN AND UNLABORED. BED LOW, CALL LIGHT IN REACH, CPOC.

## 2017-06-19 NOTE — NUR
MORNING MEDS PASSED. FAMILY AT BEDSIDE. AFRO IN PROGRESS. AAOX4. NO COMPLAINTS
OF PAIN. TELEMETRY ON. LUNG SOUNDS CLEAR AND EQUAL BILAT. BOWEL SOUNDS ACTIVE
X4 QUADS. BILAT SWELLING TO UE. +2 PITTING EDEMA TO BLE. NO SCD'S ORDERED AT
THIS TIME DUE TO CTA RESULTS. PIV TO L HAND FREE OF REDNESS AND SWELLING. OPEN
SORE TO GREAT TOE WITH SCAB. NO ODOR OR DRAINAGE NOTED. BED LOW, CALL LIGHT IN
REACH, DENIES NEEDS. CPOC.

## 2017-06-19 NOTE — NUR
SHIFT ASSESSMENT COMPLETED. PT RECIEVED 8 UNITS INSULIN PER SLIDING SCALE FOR
DVLJ=441. SCHEDULED LANTUS GIVEN PER ORDER. NO NEEDS ARE VOICED. WILL MONITOR.
SIDE RAILS X 2. BED LOW. CALL LIGHT IN REACH.

## 2017-06-19 NOTE — NUR
AWAKE AND ALERT TEXTING ON CELL PHONE AT THIS TIME. ON FIRST STEP OVERLAY.
RESPIRATIONS EVEN AND NON LABORED. CALL LIGHT IN REACH, WILL CONTINUE WITH
PLAN OF CARE.

## 2017-06-19 NOTE — NUR
RECIEVED SHIFT REPORT. PT IS LYING IN BED. ALERT AND ORIENTED AND ABLE TO
VERBALIZE NEEDS. IV IS PATENT AND FLUIDS ARE RUNNING PER ORDER. COCHRAN IS
DRAINING URINE BY GRAVITY. PT REQUIRES SOME ASSISTANCE TURNING IN BED FOR
COMFORT AND SKIN CARE. PT DENIES ANY PAIN AT THIS TIME. NO NEEDS ARE
VERBALIZED AT THIS TIME. WILL CONTINUE TO MONITOR. SIDE RAILS ARE UP X 2. BED
IS IN LOWEST POSITION. CALL LIGHT IS WITHIN REACH.

## 2017-06-20 NOTE — NUR
FINGERSTICK BLOOD SUGAR 119. VSS WITH L/GROIN CDI NO DISTRESS NOTED.
CHEST PAIN IS DENIED. REPORT CALLED TO LUIS ENRIQUE ON MED2 FOR ROOM 2118.
PATIENT TRANSPORTED VIA STRETCHER

## 2017-06-20 NOTE — NUR
INITIAL ROUNDS MADE. PT SITTING UP IN BED RESTING WELL WITH EYES CLOSED,
AWAKENED EASILY WHEN NAME CALLED. DENIES NEEDS. CALL LIGHT IN REACH. WILL CONT
TO MONITOR.

## 2017-06-20 NOTE — NUR
ALERT IN BED. NO SIGNS OF DISTRESS NOTED. PRIMARY NURSE SHANE ORTIZ AT
BEDSIDE. BED IN LOW POSITION. CALL LIGHT IN REACH.

## 2017-06-20 NOTE — NUR
1120 RECIEVED PATIENT TO ROOM VIA STRETCHER FROM CATH LAB WITH 6 FR
EXOSEAL L/GROIN CDI NO BLEEDING NO HEMATOMA NOTED. VSS WITH CHEST
PAIN DENIED.COCHRAN CATH IN PLACE WITH BLOOD TINGED URINE TO
COLLECTION. AIR MATRESS IN USE. ESCAR TISSUE NOTED TO BILATERAL LOWER
PHALANGES WITH PULSES WEAK WILL MONITOR

## 2017-06-20 NOTE — NUR
NUTRITION MONITORING & EVAL
CHART REVIEWED, PT CURRENTLY SLEEPING. TRANSFERRED TO PCU.
INTAKE OF RECENT MEALS ~ 50% ADA DIET.
RD FOLLOWING

## 2017-06-20 NOTE — NUR
PT REC'D FROM [facility]. RESTING IN BED WATCHING TV. AAOX4. NO COMPLAINTS OF
PAIN. PIV TO L HAND FREE OF REDNESS AND SWELLING. BILAT GENERALIZED EDEMA TO
UE, AND +2 PITTING EDEMA TO BLE. SCD'S CONTRAINDICATED AT THIS TIME PER CTA
RESULTS. COCHRAN CATHETER DRAINING CONCENTRATED URINE WITH SOME SEDIMENT TO
GRAVITY. 1ST STEP OVERLAY ON BED. DISCUSSED PRE-PROCEDURE THINGS FOR HEART
CATH SUCH AS PREOP MEDS AND NPO STATUS. NO QUESTIONS OR CONCERNS VOICED AT
THIS TIME. BED LOW, CALL LIGHT IN REACH, DENIES NEEDS. CPOC.

## 2017-06-21 NOTE — NUR
PT AT BS FOR ROM.  RESP UL ON 02 2L NC.  IV PATENT.TELEMETRY SR.  SCDS ON.
RESTS ON 1ST STEP MATRESS WITH CALL LIGHT IN REACH.  WILL CONT. PLAN OF CARE.

## 2017-06-21 NOTE — NUR
RESUMED CARE OF PT, LYING IN BED RESPIRATIONS EVEN AND UNLABORED ON 2LPM VIA
NC.  102 ST ON TELEMETRY.  LEFT HAND INFUSING NS @ KVO.  CALL LIGHT IN REACH.
NO NEEDS NOTED AT THIS TIME.  1ST STEP OVERLAY INFLATED.  WILL CONTINUE TO
MONITOR.  SEE NURSE ASSESSMENT.

## 2017-06-22 NOTE — NUR
ASSESSMENT COMPLETED. TELEMERTY SHOWS ST . LEFT HAND  WITH NS AT KVO. ON
A FIRST STEP OVER LAY MATTRESS. RIGHT BIG TOE WITH NACROTIS. RIGHT ARM FLACID.
RIGHT GROIN WITH DRSG TO AFRO SITE CLEAN AND DRY. SCD'S ON

## 2017-06-22 NOTE — NUR
RESUMED CARE OF PT, LYING IN BED RESPIRATIONS EVEN AND UNLABORED ON 2LPM VIA
NC.  102 ST ON TELEMETRY.  FAMILY AT BEDSIDE, PLAN OF CARE DISCUSSED.  COCHRAN
TO GRAVITY, 1ST STEP OVERLAY INFLATED.  LEFT HAND INFUSING NS @ KVO.  SCDS OFF
PER PT REQUEST.  CALL LIGHT IN REACH.  SEE NURSE ASSESSMENT.  WILL CONTINUE TO
MONTIOR.

## 2017-06-22 NOTE — NUR
ROUNDING WITH PATIENT SEEN ON 1ST STEP OVERLAY MATTRESS. APPEARS TO BE ASLEEP,
RESP ARE EVEN AND NON LABORRED. ON HEART MONITOR SHOWING ST, .

## 2017-06-23 NOTE — NUR
ON 2L PER NC, DENIES ANY SHORTNESS OF BREATH. LEFT HAND SEEN WITH NS INFUSING
AT KVO RATE. ON 1ST STEP OVERLAY MATTRESS. ON HEART MONITOR SHOWING ST. DENIES
ANY NEEDS AT THIS TIME.

## 2017-06-23 NOTE — NUR
Nutrition Follow Up:
Pt was asleep at the time of RD visit. Interview deferred. Pt is eating 54%
meal avg on a diabetic diet. No new wt to assess. No BM since admit - x 10
days. Labs reviewed - Glucose elevated. Meds noted.
Pt with fair po intake. Rec continue current diet.
Rec consider bowel regimen.
RD will continue to monitor pt progress.

## 2017-06-23 NOTE — NUR
WOUND CARE CONSULT:
NOTED CHRONIC NONHEALING WOUND TO RIGHT GREAT TOE.  NO DRAINAGE OR ODOR IS
NOTED. THE TOE IS COVERED WITH A SCAB.
RECOMMEND CLEANSING DAILY WITH WOUND  (LG [place]) AND DRYING WITH 4X4S.
WRAP WITH NONADHERENT GAUZE TO PROTECT.
WILL CONTINUE TO MONITOR.

## 2017-06-23 NOTE — NUR
Patient Name: LELA ORTIZ
Encounter No: [id number]
: 1955
Primary Insurance: BLUE CROSS OUT OF STATE
Anticipated DC Date:
Planned Disposition: Skilled Nursing Facility
External Planned Provider: [facility], [employer]
REHAB BED
 
 
DCP follow-up note: CM RECEIVED ORDER FOR SKILLED NURSING FACILITY PLACEMENT
FOR REHAB AND IV ANTIBIOTICS. CM MET WITH PT IN ROOM, DISCUSSED REHAB OPTIONS.
PT REQUESTED PLACEMENT AT [facility] AND REHAB, CHOICE SIGNED. CM
NOTIFIED JOVANY, CLINICAL LIAISON FOR [facility] AND FAXED REFERRAL TO [facility]
VIA [facility] [phone]. CM SPOKE TO DR. CRUZ, REQUESTED ORDER FOR
OCCUPATIONAL THERAPY EVALUATION TO ASSIST WITH INSURANCE AUTHORIZATION FOR
SERIVCES IN SKILLED NURSING FACILITY.
 
CM WAITING ADMISSION DETERMINATION AND INSURANCE AUTHORIZATION FOR ENTRY INTO
[facility] AND REHAB.
 
Shaan Molina, CASE MANAGEMENT

## 2017-06-23 NOTE — NUR
ASSESSMENT COMPLETED. TELEMERTY SHOWS SR AT 99. FILEY TO BEDSIDE DRAINAGE. 1ST
STEP MATTRESS NOTED. NARCOTIC RIGHT BIG TOE. LEFT HAND IV WITH NS AT KVO. O2
AT 2 L/M PER NC. RIGHT ARM FLACID. DENIES ANY NEEDS. CALL LIGHT IN REACH WITH
SR UP

## 2017-06-24 NOTE — NUR
TELEMETRY SR.  RESP UL ON 02 2L NC.  IV PATENT.  DIMAS INTACT.  RESTS ON 1ST
STEP MATRESS WITH CALL LIGHT IN REACH.  WILL CONT. PLAN OF CARE.

## 2017-06-24 NOTE — NUR
PT RESTING IN BED. ALERT/ORIENTED. COCHRAN PATENT TO BEDSIDE DRAIN BAG. NS @ KVO
TO LEFT HAD. NOW ON ROOM AIR WITH NONLABORED RESPIRATIONS. BILATERAL LOWER
LEGS ARE EDEMATOUS AND HAVE AREAS OF CELLULITIS/RED INFLAMMED SKIN. RIGHT
GREAT TOE HAS GANGRENE. PT ON 1ST STEP AIR BED. FSBS EVERY 4 HOURS. SEE SHIFT
ASSESSMENT. CPOC.

## 2017-06-25 NOTE — NUR
PT RESTING IN BED WITH NO DISTRESS. NONLABORED RESPIRATIONS ON ROOM AIR. ON
1ST STEP AIR BED. MEPILEX DRESSING TO REAR INTACT. PIV TO LEFT HAND WITH NS @
50ML/HR INFUSING. SEE SHIFT ASSESSMENT. CPOC.

## 2017-06-25 NOTE — NUR
FSBS 193, SCHEDULED LANTUS 30 UNITS AND 8 HUMULIN REG PER SLIDING SCALE
ADMINISTERED. PT SAYING HIS NEW 1ST STEP AIR BED IS MUCH MORE COMFORTABLE AN
HIS SPIRITS SEEM MORE UP BEAT TONIGHT. CPOC. CALL LIGHT IN REACH.

## 2017-06-26 NOTE — NUR
RECEIVED PT IN BED AAOX4 RESP UNLABORED DENIES ANY NEEDS AT THIS TIME NAD
NOTED WILL CONTINUE TO MONITOR

## 2017-06-26 NOTE — OP
PATIENT NAME:  LELA ORTIZ                             MEDICAL RECORD: [id number]
:51                                             LOCATION:D.M2     D.2118
                                                         ADMISSION DATE:17
SURGEON:  CARI BARBOSA MD             
 
 
DATE OF OPERATION:  2017
 
PROCEDURES:
1.  PTA, anterior tibial right.
2.  Aortofemoral runoff.
3.  Abdominal aortography.
 
INDICATION:  Claudication and peripheral vascular disease, nonhealing ulcer,
right foot.
 
PROCEDURE IN DETAIL:  After informed consent was obtained and after detailed
explanation of risks, benefits as well as alternative therapies, the patient
elected to proceed with angiogram and angioplasty.  The left femoral area was
prepped and draped in a normal sterile fashion.  The left femoral artery was
cannulated via modified Seldinger technique with placement of 6-Slovenian
around-the-horn sheath.  All catheters exchanged through this sheath.
 
FINDINGS:  Abdominal aortography was performed.  The catheter was pulled down
for aortofemoral runoff.  Abdominal aortography reveals no significant abdominal
aortic disease.  No dissection or aneurysm formation.
 
RIGHT LEG:
A.  Iliac:  The common internal and external iliacs have mild irregularities,
but no flow-limiting stenosis.
B.  Femoral system:  The common superficial and deep femoral have moderate
irregularities, but no flow-limiting stenosis.
C.  Popliteal and infrapopliteal vessels:  The popliteal is widely patent. 
Posterior tibial and peroneal are widely patent.  Anterior tibial is totally
occluded in the mid vessel.  This correlates with the area of nonhealing ulcer.
 
LEFT LEG:
A. Iliac:  The common internal and external iliacs have moderate irregularities,
but no flow-limiting stenosis.
B.  Femoral system:  The common superficial and deep femoral have moderate
irregularities, but no flow-limiting stenosis.
C.  Popliteal and infrapopliteal vessels:  Are patent.  Good 3-vessel runoff to
the foot, although diffusely diseased.
 
PTA OF THE RIGHT ANTERIOR TIBIAL:  We were able to traverse a total occlusion
with a ChoICE PT extra support wire.  Ballooning was undertaken with 2.5 Saber
balloon.  Multiple inflations were made up to 11 atmospheres.  Result was 0%
residual stenosis with restoration of brisk distal flow.
 
IMPRESSION:  Successful percutaneous transluminal angioplasty of the anterior
tibia going from 100% initial stenosis to 0% residual stenosis with restoration
of brisk distal flow.
 
TRANSINT:[id number] Voice Confirmation ID: [id number] DOCUMENT ID: [id number]
 
 
 
OPERATIVE REPORT                               [id number]    LELA ORTIZ, CARI VALENZUELA             
 
 
 
Electronically Signed by CARI BARBOSA on 17 at 1502
 
 
 
 
 
 
 
 
 
 
 
 
 
 
 
 
 
 
 
 
 
 
 
 
 
 
 
 
 
 
 
 
 
 
 
 
 
 
 
 
 
CC:                                                             [id number]
DICTATION DATE: 17 1106     :     17      ADM IN  
                                                                              
[address] [place] [address]

## 2017-06-26 NOTE — OP
PATIENT NAME:  LELA ORTIZ                             MEDICAL RECORD: [id number]
:51                                             LOCATION:D.     D.2118
                                                         ADMISSION DATE:17
SURGEON:  CARI BARBOSA MD             
 
 
DATE OF OPERATION:  2017
 
Transesophageal Echo.
 
INDICATION:  Endocarditis.
 
PROCEDURE IN DETAIL:  After informed consent was obtained and after a detailed
explanation of the risks, benefits as well as alternative therapies, the patient
elected to proceed with transesophageal echo.  IV conscious sedation was
performed per anesthesia.  Continuous heart rate, O2 saturation, blood pressure
monitoring all undertaken, all of which remained stable.
 
FINDINGS:
1.  Left ventricular chamber size is within normal limits.  Left ventricular
systolic function is normal.  Overall ejection fraction estimated at 55 to 60%.
2.  Left atrium, right atrium, and right ventricle chamber sizes are within
normal limits.
3.  Valvular structures:  There is definite endocarditis of the tricuspid valve.
 The mitral and aortic valve showed no echocardiographic evidence of
endocarditis.
4.  No evidence of pericardial effusion or left ventricular thrombus.
 
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
endocarditis.
 
TRANSINT:[id number] Voice Confirmation ID: [id number] DOCUMENT ID: [id number]
                                           
                                           CARI BARBOSA MD             
 
 
 
Electronically Signed by CARI BARBOSA on 17 at 1502
 
 
 
 
 
 
 
 
 
 
 
 
CC:                                                             [id number]
DICTATION DATE: 17 1228     :     17 2140      ADM IN  
                                                                              
[place] [address]

## 2017-06-26 NOTE — NUR
OT NOTE: PT COMPLETED BED MOB WITH MAX A . PT COMPLETED RUE POSITIONING TO
DECREASE EDEMA. PT COMPLETED LUE AROM EXS FOR INCREASED I WITH ADLS. PT
COMPLETED SIMPLE GROOMING TASK WITH SET UP.
THANK YOU, ANA BENITO

## 2017-06-26 NOTE — NUR
PT ALERT/ORIENTED AND RESTING ON 1ST STEP AIR BED. PULLED UP AND REPOSITIONED.
IV ABT STARTED TO PIV IN LEFT WRIST. PT ALSO DRANK 1 BOTTLE OF GLUCERNA.
AFFECT/DEMEANER IS MUCH IMPROVED. INTERACTING MORE WITH STAFF, VERY PLEASANT.
NONLABORED RESPIRATIONS ON ROOM AIR. CPOC.

## 2017-06-26 NOTE — NUR
Reassessment:After cleansing with wound  most of the dried skin and
scabs were removed from right great toe to reveal intact skin.  There is a
small open area on the plantar aspect of great toe that measures 0.5cm x 0.5cm
x 0.3cm.  It is red and shows no s/s infection.  Cut a tiny piece of maxorb ag
and placed over wound and secured with a thin strip of gauze.
Pt tolerated well.

## 2017-06-26 NOTE — EC
PATIENT:LELA ORTIZ                   DATE OF SERVICE: 06/13/17
SEX: M                                  MEDICAL RECORD: [id number]
DATE OF BIRTH: [birth year]                        LOCATION:D.M2      D.211
AGE OF PATIENT: 62                             ADMISSION DATE: 06/13/17
 
REFERRING PHYSICIAN:                               
 
INTERPRETING PHYSICIAN: CARI BARBOSA MD             
 
 
 
                             ECHOCARDIOGRAM REPORT
  ECHO CHARGES 4               ECHO COMPLETE            
 
 
 
CLINICAL DIAGNOSIS: STAPH AUREUS IN BLOOD         
 
                         ECHOCARDIOGRAPHIC MEASUREMENTS
      (adult normal given)
        AC root (d.<3.7cm) 3.2    LV Septum d (<1.2 cm> 1.6 
           Valve Excursion 1.7      LV Septum (systole) 2.0 
     Left Atria (s.<4.0cm> 2.7           LVPW d(<1.2cm) 1.3 
             RV (d.<2.3cm) 2.4            LVPW (sytole) 1.9 
       LV diastole(<5.6CM) 5.2        MV E-F(>70mm/sec)     
                LV systole 3.4            LVOT Diameter 1.9 
            MV exc.(>10mm)     
Est.ejection fraction (50-75%)     Pericardial Effusion N
 
   DOPPLER:
     LVIT       A 92.0 E 73.0
       LA      RVSP     
     LVOT 133  AOP1/2T     
  Asc. Ao 128 
     RVOT     
       RA     
       PA     
 AV Gradient Peak 6.6   AV Mean 2.7   AV Area 3.1 
 MV Gradient Peak 4.5   MV Mean 1.6   MV Area     
   COMMENTS:                                              
 
 
 Cardiac Sonographer: Blanca MARTIN            
      Cardiologist:Yanet Luis                
             TAPE# PACS           
                                                                                     
 
 
DATE OF SERVICE:  06/19/2017
 
Echocardiogram
 
FINDINGS:
1.  Left ventricular chamber size is within normal limits.  Left ventricular
systolic function is normal.  Overall ejection fraction estimated at 55%.
2.  Left atrium, right atrium, and right ventricle chamber sizes are within
normal limits.
3.  Valvular structures have normal structure and motion.  No evidence of
 
 
 
ECHOCARDIOGRAM REPORT                          [id number]    LELA ORTIZ           
 
 
vegetative endocarditis.
4.  Doppler interrogation reveals no significant valvular insufficiency or
stenosis.
5.  No evidence of pericardial effusion or left ventricular thrombus.
 
TRANSINT:[id number] Voice Confirmation ID: [id number] DOCUMENT ID: [id number]
                                           
                                           CARI BARBOSA MD             
 
 
 
Electronically Signed by CARI BARBOSA on 06/26/17 at 1500
 
 
 
 
 
 
 
 
 
 
 
 
 
 
 
 
 
 
 
 
 
 
 
 
 
 
 
 
 
 
 
 
 
 
CC:                                                             [id number]
DICTATION DATE: 06/19/17 1142     :     06/19/17 2906      ADM IN  
                                                                              
[facility]                                          
[address] [id number]

## 2017-06-26 NOTE — NUR
Patient Name: LELA ORTIZ
Encounter No: [id number]
: 1955
Primary Insurance: BLUE CROSS OUT OF STATE
Anticipated DC Date:
Planned Disposition: Skilled Nursing Facility
External Planned Provider: [facility], [facility] SKILLED BED
 
 
DCP follow-up note: CM NOTIFIED JOVANY, CLINICAL LIAISON FOR [facility] AND RE
FAXED REFERRAL TO [facility] VIA [facility] [phone]. PT MAY HAVE SUBSTANTIAL
COPAY FOR SERVICES FOR SNF PLACEMENT, JOVANY WILL NOTIFY CM/PT WHEN AMOUNT IS
KNOWN.
 
CM WAITING ADMISSION DETERMINATION AND INSURANCE AUTHORIZATION FOR ENTRY INTO
[facility].
 
Shaan Molina, CASE MANAGEMENT

## 2017-06-27 NOTE — DS
PATIENT:LELA ORTIZ                     :[birth year]   MEDICAL RECORD: [id number]
 
                              DISCHARGE SUMMARY
                                                         
ADMISSION DATE:    17                       DISCHARGE DATE:             
 
 
A 62-year-old  male.
 
DATE OF ADMISSION:  2017
 
DATE OF DISCHARGE:  2017
 
ADMISSION DIAGNOSES:  Dehydration, uncontrolled diabetes mellitus,
cellulitis/abscess of foot, foot ulcer due to secondary diabetes and peripheral
vascular disease.
 
DISCHARGE DIAGNOSES:  Peripheral vascular disease, diabetic foot, sepsis, acute
renal failure and acute encephalopathy.
 
CONSULTS:  Dr. Valles, infectious disease; Dr. Booker, neurology; nephrology,
orthopedist, cardiology and interventional radiology.
 
HOSPITAL COURSE:  The patient was admitted.  Blood cultures obtained.  This grew
out Staph aureus and group C strep.  The patient was started on IV Ancef per
infectious disease, underwent revascularization of his leg.  No surgical
intervention required.  Otherwise, continued to improve.  Aggressive management
of his diabetes with Lantus and sliding scale.  The patient is feeling much
better.  A DORA was obtained with the sepsis showed verbal report of early
subacute bacterial endocarditis and recommendation for 6 weeks of IV antibiotic
therapy by infectious disease.  The patient is feeling much better, has been
accepted for skilled nursing facility for continued care therapy and IV
antibiotics.  Discharged to skilled nursing in significantly improved condition.
 
PHYSICAL EXAMINATION:
VITAL SIGNS:  On discharge, temperature 99, blood pressure 142/80, heart rate
103, respirations 20 and O2 sats 94% on room air.
GENERAL:  Alert and oriented, no present distress.
HEART:  Regular rate and rhythm.
LUNGS:  Clear.
ABDOMEN:  Soft, nontender.  Bowel sounds all 4 quadrants.
EXTREMITIES:  Present times 4.  Edema significantly resolved.  A small necrotic
wound in the right great toe, erythema has resolved.  Wound care will continue.
 
DISCHARGE INSTRUCTIONS:  Please see chart for further details of this complex
case.  Agree with the assessment and plan from all consults.
 
TRANSINT:[id number] Voice Confirmation ID: [id number] DOCUMENT ID: [id number]
                                           
                                           BRENNEN CRUZ DO            
 
 
 
Electronically Signed by BRENNEN SAMPSON on 17 at 0806
CC:                                                             [id number]
DICTATION DATE: 17 0823     :     17 0207      ADM IN  
                                                                              
[facility]                                          
[address] [place] [address]

## 2017-06-27 NOTE — NUR
Patient Name: LELA ORTIZ
Encounter No: [id number]
: 1955
Primary Insurance: BLUE CROSS OUT OF STATE
Anticipated DC Date:
Planned Disposition: Skilled Nursing Facility
External Planned Provider: [facility], [facility] SKILLED BED
 
 
DCP follow-up note: CM FAXED UPDATE WITH OT EVALUATION TO [facility]IA [facility]
[phone].
 
CM WAITING INSURANCE AUTHORIZATION FOR ENTRY INTO [facility].
 
Shaan Molina, CASE MANAGEMENT

## 2017-06-27 NOTE — NUR
RESUMED CARE OF PT, LYING IN BED RESPIRATIONS EVEN AND UNLABRED ON ROOM AIR.
103 ST ON TELEMETRY.  LEFT HAND INFUSING NS @ 50.  COCHRAN TO GRAVITY.  1ST STEP
OVERLAY INFLATED.  NO NEEDS  NOTED AT THIS TIME, CALL LIGHT IN REACH.  WILL
CONTINUE TO MONITOR.  SEE NURSE ASSESSMENT.

## 2017-06-28 NOTE — NUR
OT NOTE: PT COMPLETED RUE PROM AS TOLERATED BY PATIENT. PT COMPLETED RUE
OPTIMAL POSITIONING FOR DECREASED EDEMA AND SKIN BREAKDOWN. RETRO GRADE
MASSAGE TO RUE.
THANK YOU, TOMÁS BENITO/PHILIPPE

## 2017-06-28 NOTE — NUR
, HELD S/S COVERAGE DUE TO PT DROPPING LOW THROUGH OUT THE NIGHT.  GAVE
LANTUS 30 UNITS AS ORDERED.  WILL CONT TO MONITOR.

## 2017-06-28 NOTE — NUR
CNA AT BEDSIDE TO OBTAIN VITALS, CALL LIGHT IN REACH.  WILL CONTINUE WITH PLAN
OF CARE.  [address] ON TELEMETRY

## 2017-06-29 NOTE — NUR
Patient Name: LELA ORTIZ
Encounter No: [id number]
: 1955
Primary Insurance: BLUE CROSS OUT OF STATE
Anticipated DC Date:
Planned Disposition: Skilled Nursing Facility
External Planned Provider: [facility], INSURANCE REHAB/SKILLED
BED
 
 
DCP follow-up note: CM RECEIVED MESSAGE THAT DR. CRUZ IS UPSET THAT PT IS
STILL IN HOSPTIAL AFTER BEING DISCHARGED. DEVON CONTACTED JOVANY, CLINICAL
COORDINATOR FOR [facility], [phone], REQUESTED UPDATE. JOVANY RESPONDED THAT
AS OF YESTERDAY AFTERNOON, [facility] IS STILL WAITING ON AUTORIZATION AND
INSURANCE SAID THEY HAVE 72 HOURS TO RESPOND. JOVANY REPORTED THAT WE SHOULD
BE ABOUT THERE. PT UPDATED. RN DEVON VALDEZ HAD NOTIFIED DR. CRUZ THAT WE
CONTINUE WAITING INSURANCE AUTH THIS MORNING DURING THE 'S ROUNDS.
 
CM WAITING INSURANCE AUTHORIZATION FOR ENTRY INTO [facility].
 
Shaan Molina, CASE MANAGEMENT

## 2017-06-29 NOTE — NUR
TELEMETRY SR/  IV PATENT.  COCHRAN INTACT.  COMPLETE BATH GIVEN BY CNA AND PT
ASSIST.  WILL CONT. PLAN OF CARE.

## 2017-06-29 NOTE — NUR
Patient Name: LELA ORTIZ
Encounter No: [id number]
: 1955
Primary Insurance: BLUE CROSS OUT OF STATE
Anticipated DC Date: 2017
Planned Disposition: Skilled Nursing Facility
External Planned Provider: [facility], COMMERCIAL INSURANCE
SKILLED BED
 
 
DCP follow-up note: CM RECEIVED MESSAGE FROM JOVANY [facility], AUTHORIZATION
RECEIVED FOR REHAB/SKILLED SERVICES, PT HAS 25% COPAY. CM DISCUSSED WITH PT,
PT WILLING FOR DISCHARGE TO [facility], REPORTS UNDERSTANDING OF COPAY
RESPONSIBILITY. CM CALLED DR CRUZ IN CLINIC, NOTIFIED OF PT'S ACCEPTANCE.
CM NOTIFIED BEDSIDE NURSE AND INFORMED HER THAT DR. CRUZ INSTRUCTED TO CALL
HIM IF ANYTHING FURTHER NEEDED FOR DISCHARGE TODAY. CM NOTIFIED VASCULAR
ACCESS NURSE FOR PICC LINE. PT TO TRANSPORT VIA AMBULANCE. CM FAXED DISCHARGE
INFORMATION TO [facility] VIA [employer] [phone].
 
NURSE REPORT TO BE CALLED TO [facility], [phone]. PT TO
TRANSPORT VIA AMBULANCE.
 
CYNTHIA Stevens

## 2017-06-29 NOTE — NUR
PICC LINE INSERTED BY SHASTA GALAN.  X-RAY REPORT INDICATES LINE IS GOOD.
REPORT CALLED TO RN AT [facility].  TELEMETRY DCD.  LEAVING HOSP WITH [facility] FOR TRANSPORT.

## 2019-08-19 ENCOUNTER — HOSPITAL ENCOUNTER (OUTPATIENT)
Dept: HOSPITAL 84 - D.HCCARDIO | Age: 64
Discharge: HOME | End: 2019-08-19
Attending: INTERNAL MEDICINE
Payer: MEDICARE

## 2019-08-19 VITALS — BODY MASS INDEX: 24.1 KG/M2

## 2019-08-19 DIAGNOSIS — I20.9: Primary | ICD-10-CM

## 2019-08-27 NOTE — ST
PATIENT:LELA ORTIZ                               MEDICAL RECORD: [id number]
SEX: M                                                   LOCATION:[facility]         
ORDER #:                                                 ADMISSION DATE: 08/19/19
AGE OF PATIENT: 64            
 
REFERRING PHYSICIAN:                                                             
 
INTERPRETING PHYSICIAN: CARI BARBOSA MD             

 
 
DATE OF SERVICE:  08/19/2019
 
PROCEDURE:  Nuclear stress test.
 
INDICATION:  Angina, hypertension.
 
TECHNIQUE:  He was exercised on standard Erasmo protocol for 7 minutes achieving
100% max target heart rate response with 33 mCi of sestamibi injected at peak
stress, 11 mCi used previously for rest images.
 
FINDINGS:  Gated SPECT was not able to be performed secondary to multiple
premature beats.  SPECT imaging Cardiolite was used as myocardial perfusion
agent.  There is a large amount of myocardium involved with reversible ischemia
laterally, inferiorly as well as apically.  The lateral segments over the basal,
mid and apical inferior as well as the apex itself.  The degree reversibility is
moderate.
 
OVERALL IMPRESSION:  This is a high risk abnormal nuclear stress test with a
large amount of myocardium involved with reversible ischemia inferiorly,
apically, and laterally suggestive of hemodynamically significant coronary
artery disease and most likely multivessel coronary artery disease.
 
TRANSINT:[id number] Voice Confirmation ID: [id number] DOCUMENT ID: [id number]
 
 
                                           
                                           CARI BARBOSA MD             
 
 
 
Electronically Signed by CARI BARBOSA on 08/27/19 at 1109
 
 
 
 
 
 
 
 
CC: BRENNEN CRUZ                                              [id number]
DICTATION DATE: 08/20/19 1635     :     08/20/19 2300      DEP CLI 
                                                                      08/19/19
[address] [place] [address]

## 2019-08-28 ENCOUNTER — HOSPITAL ENCOUNTER (INPATIENT)
Dept: HOSPITAL 84 - D.CATH | Age: 64
LOS: 6 days | Discharge: HOME | DRG: 233 | End: 2019-09-03
Attending: INTERNAL MEDICINE | Admitting: INTERNAL MEDICINE
Payer: MEDICARE

## 2019-08-28 VITALS — DIASTOLIC BLOOD PRESSURE: 60 MMHG | SYSTOLIC BLOOD PRESSURE: 107 MMHG

## 2019-08-28 VITALS — SYSTOLIC BLOOD PRESSURE: 131 MMHG | DIASTOLIC BLOOD PRESSURE: 77 MMHG

## 2019-08-28 VITALS
WEIGHT: 165.35 LBS | HEIGHT: 71 IN | WEIGHT: 165.35 LBS | BODY MASS INDEX: 23.15 KG/M2 | BODY MASS INDEX: 23.15 KG/M2 | HEIGHT: 71 IN | BODY MASS INDEX: 23.15 KG/M2

## 2019-08-28 VITALS — DIASTOLIC BLOOD PRESSURE: 60 MMHG | SYSTOLIC BLOOD PRESSURE: 123 MMHG

## 2019-08-28 VITALS — DIASTOLIC BLOOD PRESSURE: 75 MMHG | SYSTOLIC BLOOD PRESSURE: 142 MMHG

## 2019-08-28 VITALS — DIASTOLIC BLOOD PRESSURE: 70 MMHG | SYSTOLIC BLOOD PRESSURE: 127 MMHG

## 2019-08-28 DIAGNOSIS — J96.01: ICD-10-CM

## 2019-08-28 DIAGNOSIS — J98.11: ICD-10-CM

## 2019-08-28 DIAGNOSIS — I10: ICD-10-CM

## 2019-08-28 DIAGNOSIS — I25.110: Primary | ICD-10-CM

## 2019-08-28 DIAGNOSIS — E78.5: ICD-10-CM

## 2019-08-28 DIAGNOSIS — R53.81: ICD-10-CM

## 2019-08-28 DIAGNOSIS — D62: ICD-10-CM

## 2019-08-28 DIAGNOSIS — D72.829: ICD-10-CM

## 2019-08-28 DIAGNOSIS — J96.02: ICD-10-CM

## 2019-08-28 DIAGNOSIS — N17.9: ICD-10-CM

## 2019-08-28 DIAGNOSIS — E11.9: ICD-10-CM

## 2019-08-28 LAB
ALBUMIN SERPL-MCNC: 3.3 G/DL (ref 3.4–5)
ALP SERPL-CCNC: 70 U/L (ref 46–116)
ALT SERPL-CCNC: 16 U/L (ref 10–68)
ALT SERPL-CCNC: 17 U/L (ref 10–68)
ANION GAP SERPL CALC-SCNC: 13.5 MMOL/L (ref 8–16)
ANION GAP SERPL CALC-SCNC: 8.7 MMOL/L (ref 8–16)
APTT BLD: 30.5 SECONDS (ref 22.8–39.4)
BASOPHILS NFR BLD AUTO: 0.4 % (ref 0–2)
BASOPHILS NFR BLD AUTO: 0.4 % (ref 0–2)
BILIRUB SERPL-MCNC: 0.6 MG/DL (ref 0.2–1.3)
BUN SERPL-MCNC: 22 MG/DL (ref 7–18)
BUN SERPL-MCNC: 25 MG/DL (ref 7–18)
CALCIUM SERPL-MCNC: 8.4 MG/DL (ref 8.5–10.1)
CALCIUM SERPL-MCNC: 9 MG/DL (ref 8.5–10.1)
CHLORIDE SERPL-SCNC: 105 MMOL/L (ref 98–107)
CHLORIDE SERPL-SCNC: 107 MMOL/L (ref 98–107)
CHOLEST/HDLC SERPL: 3.6 RATIO (ref 2.3–4.9)
CO2 SERPL-SCNC: 26.6 MMOL/L (ref 21–32)
CO2 SERPL-SCNC: 29.9 MMOL/L (ref 21–32)
CREAT SERPL-MCNC: 1.2 MG/DL (ref 0.6–1.3)
CREAT SERPL-MCNC: 1.3 MG/DL (ref 0.6–1.3)
EOSINOPHIL NFR BLD: 2.7 % (ref 0–7)
EOSINOPHIL NFR BLD: 4.7 % (ref 0–7)
ERYTHROCYTE [DISTWIDTH] IN BLOOD BY AUTOMATED COUNT: 14.2 % (ref 11.5–14.5)
ERYTHROCYTE [DISTWIDTH] IN BLOOD BY AUTOMATED COUNT: 14.3 % (ref 11.5–14.5)
EST. AVERAGE GLUCOSE BLD GHB EST-MCNC: 232 MG/DL (ref 74–154)
GLOBULIN SER-MCNC: 3.2 G/L
GLUCOSE SERPL-MCNC: 203 MG/DL (ref 74–106)
GLUCOSE SERPL-MCNC: 98 MG/DL (ref 74–106)
HCT VFR BLD CALC: 37.6 % (ref 42–54)
HCT VFR BLD CALC: 40.6 % (ref 42–54)
HDLC SERPL-MCNC: 69 MG/DL (ref 32–96)
HGB BLD-MCNC: 12.5 G/DL (ref 13.5–17.5)
HGB BLD-MCNC: 13.7 G/DL (ref 13.5–17.5)
IMM GRANULOCYTES NFR BLD: 0 % (ref 0–5)
IMM GRANULOCYTES NFR BLD: 0.2 % (ref 0–5)
INR PPP: 1.06 (ref 0.85–1.17)
LDL-HDL RATIO: 2.1 RATIO (ref 1.5–3.5)
LDLC SERPL-MCNC: 142 MG/DL (ref 0–100)
LYMPHOCYTES NFR BLD AUTO: 25.9 % (ref 15–50)
LYMPHOCYTES NFR BLD AUTO: 34.2 % (ref 15–50)
MCH RBC QN AUTO: 28.6 PG (ref 26–34)
MCH RBC QN AUTO: 28.8 PG (ref 26–34)
MCHC RBC AUTO-ENTMCNC: 33.2 G/DL (ref 31–37)
MCHC RBC AUTO-ENTMCNC: 33.7 G/DL (ref 31–37)
MCV RBC: 85.5 FL (ref 80–100)
MCV RBC: 86 FL (ref 80–100)
MONOCYTES NFR BLD: 8.4 % (ref 2–11)
MONOCYTES NFR BLD: 8.7 % (ref 2–11)
NEUTROPHILS NFR BLD AUTO: 52.1 % (ref 40–80)
NEUTROPHILS NFR BLD AUTO: 62.3 % (ref 40–80)
OSMOLALITY SERPL CALC.SUM OF ELEC: 283 MOSM/KG (ref 275–300)
OSMOLALITY SERPL CALC.SUM OF ELEC: 288 MOSM/KG (ref 275–300)
PA ADP PRP-ACNC: 188 PRU (ref 194–418)
PHOSPHATE SERPL-MCNC: 3.9 MG/DL (ref 2.5–4.9)
PLATELET # BLD: 166 10X3/UL (ref 130–400)
PLATELET # BLD: 183 10X3/UL (ref 130–400)
PMV BLD AUTO: 10.4 FL (ref 7.4–10.4)
PMV BLD AUTO: 10.6 FL (ref 7.4–10.4)
POTASSIUM SERPL-SCNC: 4.6 MMOL/L (ref 3.5–5.1)
POTASSIUM SERPL-SCNC: 5.1 MMOL/L (ref 3.5–5.1)
PROT SERPL-MCNC: 6.5 G/DL (ref 6.4–8.2)
PROTHROMBIN TIME: 13.3 SECONDS (ref 11.6–15)
RBC # BLD AUTO: 4.37 10X6/UL (ref 4.2–6.1)
RBC # BLD AUTO: 4.75 10X6/UL (ref 4.2–6.1)
SODIUM SERPL-SCNC: 140 MMOL/L (ref 136–145)
SODIUM SERPL-SCNC: 141 MMOL/L (ref 136–145)
T4 FREE SERPL-MCNC: 0.84 NG/DL (ref 0.76–1.46)
TRIGL SERPL-MCNC: 176 MG/DL (ref 30–200)
TSH SERPL-ACNC: 1.44 UIU/ML (ref 0.36–3.74)
URATE SERPL-MCNC: 6.1 MG/DL (ref 2.6–7.2)
WBC # BLD AUTO: 4.5 10X3/UL (ref 4.8–10.8)
WBC # BLD AUTO: 5.2 10X3/UL (ref 4.8–10.8)

## 2019-08-28 PROCEDURE — 4A023N7 MEASUREMENT OF CARDIAC SAMPLING AND PRESSURE, LEFT HEART, PERCUTANEOUS APPROACH: ICD-10-PCS | Performed by: INTERNAL MEDICINE

## 2019-08-28 PROCEDURE — B2151ZZ FLUOROSCOPY OF LEFT HEART USING LOW OSMOLAR CONTRAST: ICD-10-PCS | Performed by: INTERNAL MEDICINE

## 2019-08-28 PROCEDURE — B2111ZZ FLUOROSCOPY OF MULTIPLE CORONARY ARTERIES USING LOW OSMOLAR CONTRAST: ICD-10-PCS | Performed by: INTERNAL MEDICINE

## 2019-08-29 VITALS — SYSTOLIC BLOOD PRESSURE: 115 MMHG | DIASTOLIC BLOOD PRESSURE: 56 MMHG

## 2019-08-29 VITALS — DIASTOLIC BLOOD PRESSURE: 59 MMHG | SYSTOLIC BLOOD PRESSURE: 121 MMHG

## 2019-08-29 VITALS — SYSTOLIC BLOOD PRESSURE: 132 MMHG | DIASTOLIC BLOOD PRESSURE: 57 MMHG

## 2019-08-29 VITALS — DIASTOLIC BLOOD PRESSURE: 80 MMHG | SYSTOLIC BLOOD PRESSURE: 154 MMHG

## 2019-08-29 VITALS — DIASTOLIC BLOOD PRESSURE: 62 MMHG | SYSTOLIC BLOOD PRESSURE: 129 MMHG

## 2019-08-29 VITALS — DIASTOLIC BLOOD PRESSURE: 82 MMHG | SYSTOLIC BLOOD PRESSURE: 156 MMHG

## 2019-08-29 VITALS — DIASTOLIC BLOOD PRESSURE: 60 MMHG | SYSTOLIC BLOOD PRESSURE: 128 MMHG

## 2019-08-29 VITALS — SYSTOLIC BLOOD PRESSURE: 127 MMHG | DIASTOLIC BLOOD PRESSURE: 62 MMHG

## 2019-08-29 VITALS — SYSTOLIC BLOOD PRESSURE: 144 MMHG | DIASTOLIC BLOOD PRESSURE: 92 MMHG

## 2019-08-29 VITALS — DIASTOLIC BLOOD PRESSURE: 62 MMHG | SYSTOLIC BLOOD PRESSURE: 134 MMHG

## 2019-08-29 VITALS — SYSTOLIC BLOOD PRESSURE: 114 MMHG | DIASTOLIC BLOOD PRESSURE: 49 MMHG

## 2019-08-29 VITALS — DIASTOLIC BLOOD PRESSURE: 60 MMHG | SYSTOLIC BLOOD PRESSURE: 126 MMHG

## 2019-08-29 VITALS — SYSTOLIC BLOOD PRESSURE: 121 MMHG | DIASTOLIC BLOOD PRESSURE: 57 MMHG

## 2019-08-29 VITALS — SYSTOLIC BLOOD PRESSURE: 124 MMHG | DIASTOLIC BLOOD PRESSURE: 58 MMHG

## 2019-08-29 VITALS — SYSTOLIC BLOOD PRESSURE: 128 MMHG | DIASTOLIC BLOOD PRESSURE: 58 MMHG

## 2019-08-29 VITALS — DIASTOLIC BLOOD PRESSURE: 56 MMHG | SYSTOLIC BLOOD PRESSURE: 117 MMHG

## 2019-08-29 VITALS — SYSTOLIC BLOOD PRESSURE: 143 MMHG | DIASTOLIC BLOOD PRESSURE: 96 MMHG

## 2019-08-29 VITALS — DIASTOLIC BLOOD PRESSURE: 56 MMHG | SYSTOLIC BLOOD PRESSURE: 124 MMHG

## 2019-08-29 VITALS — SYSTOLIC BLOOD PRESSURE: 115 MMHG | DIASTOLIC BLOOD PRESSURE: 58 MMHG

## 2019-08-29 VITALS — SYSTOLIC BLOOD PRESSURE: 133 MMHG | DIASTOLIC BLOOD PRESSURE: 58 MMHG

## 2019-08-29 VITALS — DIASTOLIC BLOOD PRESSURE: 63 MMHG | SYSTOLIC BLOOD PRESSURE: 136 MMHG

## 2019-08-29 VITALS — DIASTOLIC BLOOD PRESSURE: 51 MMHG | SYSTOLIC BLOOD PRESSURE: 111 MMHG

## 2019-08-29 VITALS — SYSTOLIC BLOOD PRESSURE: 143 MMHG | DIASTOLIC BLOOD PRESSURE: 89 MMHG

## 2019-08-29 VITALS — DIASTOLIC BLOOD PRESSURE: 80 MMHG | SYSTOLIC BLOOD PRESSURE: 143 MMHG

## 2019-08-29 VITALS — SYSTOLIC BLOOD PRESSURE: 155 MMHG | DIASTOLIC BLOOD PRESSURE: 81 MMHG

## 2019-08-29 VITALS — DIASTOLIC BLOOD PRESSURE: 63 MMHG | SYSTOLIC BLOOD PRESSURE: 132 MMHG

## 2019-08-29 VITALS — DIASTOLIC BLOOD PRESSURE: 52 MMHG | SYSTOLIC BLOOD PRESSURE: 128 MMHG

## 2019-08-29 VITALS — DIASTOLIC BLOOD PRESSURE: 60 MMHG | SYSTOLIC BLOOD PRESSURE: 122 MMHG

## 2019-08-29 PROCEDURE — 02100Z9 BYPASS CORONARY ARTERY, ONE ARTERY FROM LEFT INTERNAL MAMMARY, OPEN APPROACH: ICD-10-PCS | Performed by: THORACIC SURGERY (CARDIOTHORACIC VASCULAR SURGERY)

## 2019-08-29 PROCEDURE — 021209W BYPASS CORONARY ARTERY, THREE ARTERIES FROM AORTA WITH AUTOLOGOUS VENOUS TISSUE, OPEN APPROACH: ICD-10-PCS | Performed by: THORACIC SURGERY (CARDIOTHORACIC VASCULAR SURGERY)

## 2019-08-29 PROCEDURE — B24BZZ4 ULTRASONOGRAPHY OF HEART WITH AORTA, TRANSESOPHAGEAL: ICD-10-PCS | Performed by: THORACIC SURGERY (CARDIOTHORACIC VASCULAR SURGERY)

## 2019-08-29 PROCEDURE — 5A1221Z PERFORMANCE OF CARDIAC OUTPUT, CONTINUOUS: ICD-10-PCS | Performed by: THORACIC SURGERY (CARDIOTHORACIC VASCULAR SURGERY)

## 2019-08-29 PROCEDURE — 06BP4ZZ EXCISION OF RIGHT SAPHENOUS VEIN, PERCUTANEOUS ENDOSCOPIC APPROACH: ICD-10-PCS | Performed by: THORACIC SURGERY (CARDIOTHORACIC VASCULAR SURGERY)

## 2019-08-29 NOTE — OP
PATIENT NAME:  LELA ORTIZ                             MEDICAL RECORD: [id number]
:[birth year]                                             LOCATION:[facility]    D.2305
                                                         ADMISSION DATE:19
SURGEON:  ZACHARIAH RIGGINS MD          
 
 
DATE OF OPERATION:  2019
 
PROCEDURE:  Left heart catheterization, selective coronary angiography, right
radial approach.
 
CATHETERS:  Radial sheath, Tiger catheter.
 
The procedure was well tolerated.  The patient returned to the sheikh.  Sheath was
removed.  TR band was placed.
 
FINDINGS:  Left ventriculography in 30-degree CLARKE view:  Normal wall motion. 
Normal systolic function.
 
CORONARY ANATOMY:
LEFT MAIN:  Tapers to 39% stenosis is involved at the LAD with LAD reaching the
apex was a diagonal itself, has about 80% proximal stenosis.
 
CIRCUMFLEX:  Circumflex totally occluded and fills via right to left
collaterals.
 
RIGHT CORONARY ARTERY:  Has a long diffuse stenosis in its proximal third.  On
distally, there was a tight stenosis at the takeoff of the PD and PL.
 
IMPRESSION:  Multivessel coronary disease including left main disease,
underlying diabetes, best suited for coronary artery bypass grafting.  We will
need to check P2Y12 level to see where he stands as far as timing given a
chronic Plavix therapy.  Further recommendations based on above.
 
TRANSINT:[id number] Voice Confirmation ID: [id number] DOCUMENT ID: [id number]
                                           
                                           ZACHARIAH RIGGINS MD          
 
 
 
Electronically Signed by ZACHARIAH RIGGINS on 19 at 0842
 
 
 
 
 
 
 
 
 
CC:                                                             [id number]
DICTATION DATE: 19 1610     :     19 2321      ADM IN  
                                                                              
[address] [place] [address]

## 2019-08-30 VITALS — SYSTOLIC BLOOD PRESSURE: 104 MMHG | DIASTOLIC BLOOD PRESSURE: 63 MMHG

## 2019-08-30 VITALS — DIASTOLIC BLOOD PRESSURE: 62 MMHG | SYSTOLIC BLOOD PRESSURE: 117 MMHG

## 2019-08-30 VITALS — SYSTOLIC BLOOD PRESSURE: 121 MMHG | DIASTOLIC BLOOD PRESSURE: 63 MMHG

## 2019-08-30 VITALS — DIASTOLIC BLOOD PRESSURE: 55 MMHG | SYSTOLIC BLOOD PRESSURE: 115 MMHG

## 2019-08-30 VITALS — DIASTOLIC BLOOD PRESSURE: 92 MMHG | SYSTOLIC BLOOD PRESSURE: 116 MMHG

## 2019-08-30 VITALS — DIASTOLIC BLOOD PRESSURE: 73 MMHG | SYSTOLIC BLOOD PRESSURE: 124 MMHG

## 2019-08-30 VITALS — SYSTOLIC BLOOD PRESSURE: 123 MMHG | DIASTOLIC BLOOD PRESSURE: 58 MMHG

## 2019-08-30 VITALS — SYSTOLIC BLOOD PRESSURE: 135 MMHG | DIASTOLIC BLOOD PRESSURE: 63 MMHG

## 2019-08-30 VITALS — DIASTOLIC BLOOD PRESSURE: 69 MMHG | SYSTOLIC BLOOD PRESSURE: 115 MMHG

## 2019-08-30 VITALS — SYSTOLIC BLOOD PRESSURE: 112 MMHG | DIASTOLIC BLOOD PRESSURE: 55 MMHG

## 2019-08-30 VITALS — DIASTOLIC BLOOD PRESSURE: 67 MMHG | SYSTOLIC BLOOD PRESSURE: 110 MMHG

## 2019-08-30 VITALS — SYSTOLIC BLOOD PRESSURE: 132 MMHG | DIASTOLIC BLOOD PRESSURE: 76 MMHG

## 2019-08-30 VITALS — SYSTOLIC BLOOD PRESSURE: 137 MMHG | DIASTOLIC BLOOD PRESSURE: 54 MMHG

## 2019-08-30 VITALS — DIASTOLIC BLOOD PRESSURE: 60 MMHG | SYSTOLIC BLOOD PRESSURE: 123 MMHG

## 2019-08-30 VITALS — SYSTOLIC BLOOD PRESSURE: 117 MMHG | DIASTOLIC BLOOD PRESSURE: 53 MMHG

## 2019-08-30 VITALS — SYSTOLIC BLOOD PRESSURE: 103 MMHG | DIASTOLIC BLOOD PRESSURE: 49 MMHG

## 2019-08-30 VITALS — SYSTOLIC BLOOD PRESSURE: 192 MMHG | DIASTOLIC BLOOD PRESSURE: 65 MMHG

## 2019-08-30 VITALS — SYSTOLIC BLOOD PRESSURE: 110 MMHG | DIASTOLIC BLOOD PRESSURE: 91 MMHG

## 2019-08-30 VITALS — SYSTOLIC BLOOD PRESSURE: 134 MMHG | DIASTOLIC BLOOD PRESSURE: 55 MMHG

## 2019-08-30 VITALS — SYSTOLIC BLOOD PRESSURE: 113 MMHG | DIASTOLIC BLOOD PRESSURE: 50 MMHG

## 2019-08-30 VITALS — SYSTOLIC BLOOD PRESSURE: 137 MMHG | DIASTOLIC BLOOD PRESSURE: 58 MMHG

## 2019-08-30 VITALS — DIASTOLIC BLOOD PRESSURE: 56 MMHG | SYSTOLIC BLOOD PRESSURE: 140 MMHG

## 2019-08-30 VITALS — SYSTOLIC BLOOD PRESSURE: 119 MMHG | DIASTOLIC BLOOD PRESSURE: 64 MMHG

## 2019-08-30 VITALS — DIASTOLIC BLOOD PRESSURE: 49 MMHG | SYSTOLIC BLOOD PRESSURE: 95 MMHG

## 2019-08-30 VITALS — DIASTOLIC BLOOD PRESSURE: 74 MMHG | SYSTOLIC BLOOD PRESSURE: 132 MMHG

## 2019-08-30 VITALS — DIASTOLIC BLOOD PRESSURE: 68 MMHG | SYSTOLIC BLOOD PRESSURE: 120 MMHG

## 2019-08-30 VITALS — SYSTOLIC BLOOD PRESSURE: 124 MMHG | DIASTOLIC BLOOD PRESSURE: 53 MMHG

## 2019-08-30 VITALS — SYSTOLIC BLOOD PRESSURE: 136 MMHG | DIASTOLIC BLOOD PRESSURE: 60 MMHG

## 2019-08-30 VITALS — DIASTOLIC BLOOD PRESSURE: 51 MMHG | SYSTOLIC BLOOD PRESSURE: 105 MMHG

## 2019-08-30 VITALS — DIASTOLIC BLOOD PRESSURE: 49 MMHG | SYSTOLIC BLOOD PRESSURE: 109 MMHG

## 2019-08-30 VITALS — SYSTOLIC BLOOD PRESSURE: 122 MMHG | DIASTOLIC BLOOD PRESSURE: 65 MMHG

## 2019-08-30 VITALS — SYSTOLIC BLOOD PRESSURE: 166 MMHG | DIASTOLIC BLOOD PRESSURE: 92 MMHG

## 2019-08-30 VITALS — DIASTOLIC BLOOD PRESSURE: 50 MMHG | SYSTOLIC BLOOD PRESSURE: 115 MMHG

## 2019-08-30 VITALS — DIASTOLIC BLOOD PRESSURE: 53 MMHG | SYSTOLIC BLOOD PRESSURE: 121 MMHG

## 2019-08-30 VITALS — DIASTOLIC BLOOD PRESSURE: 51 MMHG | SYSTOLIC BLOOD PRESSURE: 116 MMHG

## 2019-08-30 VITALS — SYSTOLIC BLOOD PRESSURE: 134 MMHG | DIASTOLIC BLOOD PRESSURE: 56 MMHG

## 2019-08-30 VITALS — SYSTOLIC BLOOD PRESSURE: 123 MMHG | DIASTOLIC BLOOD PRESSURE: 60 MMHG

## 2019-08-30 VITALS — SYSTOLIC BLOOD PRESSURE: 107 MMHG | DIASTOLIC BLOOD PRESSURE: 47 MMHG

## 2019-08-30 VITALS — DIASTOLIC BLOOD PRESSURE: 60 MMHG | SYSTOLIC BLOOD PRESSURE: 11 MMHG

## 2019-08-30 VITALS — SYSTOLIC BLOOD PRESSURE: 128 MMHG | DIASTOLIC BLOOD PRESSURE: 57 MMHG

## 2019-08-30 VITALS — DIASTOLIC BLOOD PRESSURE: 65 MMHG | SYSTOLIC BLOOD PRESSURE: 113 MMHG

## 2019-08-30 VITALS — DIASTOLIC BLOOD PRESSURE: 51 MMHG | SYSTOLIC BLOOD PRESSURE: 115 MMHG

## 2019-08-30 VITALS — DIASTOLIC BLOOD PRESSURE: 57 MMHG | SYSTOLIC BLOOD PRESSURE: 125 MMHG

## 2019-08-30 VITALS — SYSTOLIC BLOOD PRESSURE: 164 MMHG | DIASTOLIC BLOOD PRESSURE: 92 MMHG

## 2019-08-30 VITALS — DIASTOLIC BLOOD PRESSURE: 51 MMHG | SYSTOLIC BLOOD PRESSURE: 114 MMHG

## 2019-08-30 VITALS — DIASTOLIC BLOOD PRESSURE: 59 MMHG | SYSTOLIC BLOOD PRESSURE: 131 MMHG

## 2019-08-30 VITALS — SYSTOLIC BLOOD PRESSURE: 120 MMHG | DIASTOLIC BLOOD PRESSURE: 60 MMHG

## 2019-08-30 VITALS — DIASTOLIC BLOOD PRESSURE: 50 MMHG | SYSTOLIC BLOOD PRESSURE: 112 MMHG

## 2019-08-30 LAB
ALBUMIN SERPL-MCNC: 2.9 G/DL (ref 3.4–5)
ALP SERPL-CCNC: 52 U/L (ref 46–116)
ALT SERPL-CCNC: 20 U/L (ref 10–68)
ANION GAP SERPL CALC-SCNC: 11.8 MMOL/L (ref 8–16)
BILIRUB SERPL-MCNC: 0.66 MG/DL (ref 0.2–1.3)
BUN SERPL-MCNC: 26 MG/DL (ref 7–18)
CALCIUM SERPL-MCNC: 7.8 MG/DL (ref 8.5–10.1)
CHLORIDE SERPL-SCNC: 108 MMOL/L (ref 98–107)
CO2 SERPL-SCNC: 28.7 MMOL/L (ref 21–32)
CREAT SERPL-MCNC: 1.1 MG/DL (ref 0.6–1.3)
ERYTHROCYTE [DISTWIDTH] IN BLOOD BY AUTOMATED COUNT: 14.4 % (ref 11.5–14.5)
GLOBULIN SER-MCNC: 2.7 G/L
GLUCOSE SERPL-MCNC: 115 MG/DL (ref 74–106)
HCT VFR BLD CALC: 33.2 % (ref 42–54)
HGB BLD-MCNC: 11 G/DL (ref 13.5–17.5)
MCH RBC QN AUTO: 28.4 PG (ref 26–34)
MCHC RBC AUTO-ENTMCNC: 33.1 G/DL (ref 31–37)
MCV RBC: 85.6 FL (ref 80–100)
OSMOLALITY SERPL CALC.SUM OF ELEC: 292 MOSM/KG (ref 275–300)
PLATELET # BLD: 210 10X3/UL (ref 130–400)
PMV BLD AUTO: 10.4 FL (ref 7.4–10.4)
POTASSIUM SERPL-SCNC: 4.5 MMOL/L (ref 3.5–5.1)
PROT SERPL-MCNC: 5.6 G/DL (ref 6.4–8.2)
RBC # BLD AUTO: 3.88 10X6/UL (ref 4.2–6.1)
SODIUM SERPL-SCNC: 144 MMOL/L (ref 136–145)
WBC # BLD AUTO: 14.8 10X3/UL (ref 4.8–10.8)

## 2019-08-30 NOTE — OP
PATIENT NAME:  LELA ORTIZ                             MEDICAL RECORD: [id number]
:[birth year]                                             LOCATION:[facility]    D.CV04
                                                         ADMISSION DATE:19
SURGEON:  JUS ESPINOZA MD            
 
 
DATE OF OPERATION:  2019
 
SURGEON:  Jus Espinoza MD
 
ASSISTANT:  Vinnie Meade.
 
PROCEDURES PERFORMED:
1.  Coronary artery bypass graft times 4 (left internal mammary artery to LAD,
reverse saphenous vein graft from aorta to first diagonal, aorta to obtuse
marginal, aorta to posterior descending artery).
2.  Endoscopic saphenous vein harvest.
 
PREOPERATIVE DIAGNOSES:  Coronary artery disease with unstable angina and left
main coronary artery stenosis.
 
POSTOPERATIVE DIAGNOSES:  Coronary artery disease with unstable angina and left
main coronary artery stenosis.
 
ANESTHESIA:  General endotracheal anesthesia.
 
ESTIMATED BLOOD LOSS:  Total cardiopulmonary bypass with Cell Saver
retransfusion, 1 unit platelets.
 
COMPLICATIONS:  None.
 
SPECIMENS:  None.
 
CONDITION:  Stable.
 
DISPOSITION:  CV ICU.
 
OPERATIVE FINDINGS:
1.  Transesophageal echocardiography revealed a relatively normal heart with 50%
ejection fraction and trace mitral regurgitation before and after
cardiopulmonary bypass.
2.  Dual system in the right leg, the smaller branch was superficial dissected
out endoscopically, but a larger branch deeper required interrupted bridging
incisions.  A portion of the small caliber vein was used to the diagonal and the
greater saphenous vein used for the circumflex and PDA grafts.
3.  Severe distal disease, all vessels.
4.  LAD 1.25 mm and intramyocardial good Doppler signal after anastomosis and
after reversal of heparin.
5.  First diagonal 1.5 mm with severe disease.
6.  Obtuse marginal 2.0 with severe disease.
7.  Posterior ascending artery 1.5 with severe disease.  The posterolateral
branch was smaller in the posterior descending artery and a thin walled and it
was not grafted.
 
OPERATIVE INDICATION:  Unstable angina, left main coronary artery stenosis.
 
DESCRIPTION OF PROCEDURE:  The patient was brought to the operating suite. 
General anesthesia was obtained, the patient was prepped and draped.  Greater
 
 
 
OPERATIVE REPORT                               [id number]    LELA ORTIZ           
 
 
saphenous vein harvested endoscopically in the right lower extremity.  Side
branch divided with electrocautery.  The vessel was ligated proximally and
distally, and removed.  Small leakage sites were oversewn.  There was a small
caliber vessel at the groin.  The greater saphenous vein was identified.  A
bridging incision was made down the leg.  Side branches were divided with clips.
 Vessel was ligated proximally and distally, removed.  Thin sites were oversewn
and side branches were tied.  Leg was later irrigated and closed in 2 layers. 
On the left leg, the vein was identified.  Choteau was begun with the dissector,
but the vein was not divided nor were any branches.
 
Median sternotomy incision was made.  Subcutaneous tissue was divided by
electrocautery.  The sternum was divided with a saw.  The left hemisternum was
elevated.  Low pleural cavity was entered.  Left internal mammary vein was taken
down as a pedicle graft.
 
Sternal retractor was placed.  Pericardium was opened.  Heparin was given. 
Aorta was cannulated.  Dual stage venous cannula was inserted.  Internal mammary
clipped distally and made ready for anastomosis.  Activated clotting time was
appropriately elevated.  The patient was placed on cardiopulmonary bypass. 
Sites for distal anastomoses were selected.  Antegrade cardioplegic cannula was
inserted.  The patient's temperature was allowed to drift down really to 34
degrees.  Crossclamp was placed.  Cardioplegia was given antegrade.  This was
repeated at 15 to 20 minute intervals including down the completed vein grafts.
 
Distal anastomoses were performed in standard technique.  Proximal anastomosis
with single cross-clamp technique.  Aortic root de-aired.  Proximal anastomosis
tied down.  Grafts de-aired.  Flow restored.  Proximal and distal anastomotic
sites inspected for bleeding.  A single 6-0 one in the proximals.  The patient
resumed a rhythm with some bradycardia.  Atrial and ventricular pacing wires
were placed and there was atrial only pacing, fully rewarmed, weaned from
cardiopulmonary bypass and stable.
 
The patient was decannulated.  The cannula sites were oversewn.  Protamine was
given.  Thorough irrigation was undertaken.
 
Graft lay appropriately.  Left chest was evacuated and irrigated.  The protamine
was given.  Pericardial fat was loosely reapproximated after placing atrial and
ventricular pacing wires and drained within the mediastinum and left pleural
cavity.  The internal mammary harvest site was inspected for bleeding.  Sternum
was closed with wires.  Fascia was closed.  Subcutaneous tissues were closed. 
Skin was closed.  Dermabond was placed.  The needle and sponge counts reported
as correct.  The patient was taken to ICU in stable condition.
 
TRANSINT:[id number] Voice Confirmation ID: [id number] DOCUMENT ID: [id number]
                                           
                                           JUS ESPINOZA MD            
 
 
 
Electronically Signed by JUS ESPINOZA on 19 at 0844
CC: BRENNEN CRUZ and ZACHARIAH RIGGINS MD                     [id number]
DICTATION DATE: 19 1646     :     19 2245      ADM IN  
                                                                              
[address] [facility], [facility]

## 2019-08-31 VITALS — DIASTOLIC BLOOD PRESSURE: 76 MMHG | SYSTOLIC BLOOD PRESSURE: 113 MMHG

## 2019-08-31 VITALS — SYSTOLIC BLOOD PRESSURE: 115 MMHG | DIASTOLIC BLOOD PRESSURE: 69 MMHG

## 2019-08-31 VITALS — SYSTOLIC BLOOD PRESSURE: 112 MMHG | DIASTOLIC BLOOD PRESSURE: 71 MMHG

## 2019-08-31 VITALS — DIASTOLIC BLOOD PRESSURE: 68 MMHG | SYSTOLIC BLOOD PRESSURE: 112 MMHG

## 2019-08-31 VITALS — DIASTOLIC BLOOD PRESSURE: 57 MMHG | SYSTOLIC BLOOD PRESSURE: 101 MMHG

## 2019-08-31 VITALS — DIASTOLIC BLOOD PRESSURE: 67 MMHG | SYSTOLIC BLOOD PRESSURE: 108 MMHG

## 2019-08-31 VITALS — SYSTOLIC BLOOD PRESSURE: 110 MMHG | DIASTOLIC BLOOD PRESSURE: 71 MMHG

## 2019-08-31 VITALS — SYSTOLIC BLOOD PRESSURE: 121 MMHG | DIASTOLIC BLOOD PRESSURE: 71 MMHG

## 2019-08-31 VITALS — DIASTOLIC BLOOD PRESSURE: 74 MMHG | SYSTOLIC BLOOD PRESSURE: 111 MMHG

## 2019-08-31 VITALS — DIASTOLIC BLOOD PRESSURE: 68 MMHG | SYSTOLIC BLOOD PRESSURE: 109 MMHG

## 2019-08-31 VITALS — DIASTOLIC BLOOD PRESSURE: 74 MMHG | SYSTOLIC BLOOD PRESSURE: 121 MMHG

## 2019-08-31 VITALS — DIASTOLIC BLOOD PRESSURE: 69 MMHG | SYSTOLIC BLOOD PRESSURE: 118 MMHG

## 2019-08-31 VITALS — DIASTOLIC BLOOD PRESSURE: 68 MMHG | SYSTOLIC BLOOD PRESSURE: 106 MMHG

## 2019-08-31 VITALS — SYSTOLIC BLOOD PRESSURE: 98 MMHG | DIASTOLIC BLOOD PRESSURE: 62 MMHG

## 2019-08-31 VITALS — SYSTOLIC BLOOD PRESSURE: 104 MMHG | DIASTOLIC BLOOD PRESSURE: 64 MMHG

## 2019-08-31 VITALS — DIASTOLIC BLOOD PRESSURE: 73 MMHG | SYSTOLIC BLOOD PRESSURE: 115 MMHG

## 2019-08-31 VITALS — SYSTOLIC BLOOD PRESSURE: 94 MMHG | DIASTOLIC BLOOD PRESSURE: 69 MMHG

## 2019-08-31 VITALS — SYSTOLIC BLOOD PRESSURE: 112 MMHG | DIASTOLIC BLOOD PRESSURE: 67 MMHG

## 2019-08-31 VITALS — DIASTOLIC BLOOD PRESSURE: 63 MMHG | SYSTOLIC BLOOD PRESSURE: 105 MMHG

## 2019-08-31 VITALS — DIASTOLIC BLOOD PRESSURE: 60 MMHG | SYSTOLIC BLOOD PRESSURE: 104 MMHG

## 2019-08-31 VITALS — DIASTOLIC BLOOD PRESSURE: 63 MMHG | SYSTOLIC BLOOD PRESSURE: 100 MMHG

## 2019-08-31 VITALS — DIASTOLIC BLOOD PRESSURE: 76 MMHG | SYSTOLIC BLOOD PRESSURE: 119 MMHG

## 2019-08-31 VITALS — DIASTOLIC BLOOD PRESSURE: 65 MMHG | SYSTOLIC BLOOD PRESSURE: 103 MMHG

## 2019-08-31 LAB
ALBUMIN SERPL-MCNC: 2.6 G/DL (ref 3.4–5)
ALP SERPL-CCNC: 48 U/L (ref 46–116)
ALT SERPL-CCNC: 18 U/L (ref 10–68)
ANION GAP SERPL CALC-SCNC: 10.3 MMOL/L (ref 8–16)
APPEARANCE UR: CLEAR
BACTERIA #/AREA URNS HPF: (no result) /HPF
BASOPHILS NFR BLD AUTO: 0.1 % (ref 0–2)
BILIRUB SERPL-MCNC: 0.37 MG/DL (ref 0.2–1.3)
BILIRUB SERPL-MCNC: NEGATIVE MG/DL
BUN SERPL-MCNC: 27 MG/DL (ref 7–18)
CALCIUM SERPL-MCNC: 7.8 MG/DL (ref 8.5–10.1)
CHLORIDE SERPL-SCNC: 106 MMOL/L (ref 98–107)
CO2 SERPL-SCNC: 29 MMOL/L (ref 21–32)
COLOR UR: YELLOW
CREAT SERPL-MCNC: 1.2 MG/DL (ref 0.6–1.3)
EOSINOPHIL NFR BLD: 0.1 % (ref 0–7)
ERYTHROCYTE [DISTWIDTH] IN BLOOD BY AUTOMATED COUNT: 14.7 % (ref 11.5–14.5)
GLOBULIN SER-MCNC: 2.7 G/L
GLUCOSE SERPL-MCNC: 175 MG/DL (ref 74–106)
GLUCOSE SERPL-MCNC: 250 MG/DL
HCT VFR BLD CALC: 29 % (ref 42–54)
HGB BLD-MCNC: 9.5 G/DL (ref 13.5–17.5)
IMM GRANULOCYTES NFR BLD: 0.4 % (ref 0–5)
KETONES UR STRIP-MCNC: NEGATIVE MG/DL
LYMPHOCYTES NFR BLD AUTO: 8.6 % (ref 15–50)
MAGNESIUM SERPL-MCNC: 2.4 MG/DL (ref 1.8–2.4)
MCH RBC QN AUTO: 28.1 PG (ref 26–34)
MCHC RBC AUTO-ENTMCNC: 32.8 G/DL (ref 31–37)
MCV RBC: 85.8 FL (ref 80–100)
MONOCYTES NFR BLD: 7.1 % (ref 2–11)
MUCOUS THREADS #/AREA URNS LPF: (no result) /LPF
NEUTROPHILS NFR BLD AUTO: 83.7 % (ref 40–80)
NITRITE UR-MCNC: NEGATIVE MG/ML
OSMOLALITY SERPL CALC.SUM OF ELEC: 289 MOSM/KG (ref 275–300)
PH UR STRIP: 5 [PH] (ref 5–6)
PHOSPHATE SERPL-MCNC: 2.7 MG/DL (ref 2.5–4.9)
PLATELET # BLD: 190 10X3/UL (ref 130–400)
PMV BLD AUTO: 10.5 FL (ref 7.4–10.4)
POTASSIUM SERPL-SCNC: 4.3 MMOL/L (ref 3.5–5.1)
PROT SERPL-MCNC: 5.3 G/DL (ref 6.4–8.2)
PROT UR-MCNC: (no result) MG/DL
RBC # BLD AUTO: 3.38 10X6/UL (ref 4.2–6.1)
RBC #/AREA URNS HPF: (no result) /HPF (ref 0–5)
SODIUM SERPL-SCNC: 141 MMOL/L (ref 136–145)
SP GR UR STRIP: 1.02 (ref 1–1.02)
SQUAMOUS #/AREA URNS HPF: (no result) /HPF (ref 0–5)
UROBILINOGEN UR-MCNC: NORMAL MG/DL
WBC # BLD AUTO: 16.5 10X3/UL (ref 4.8–10.8)
WBC #/AREA URNS HPF: (no result) /HPF (ref 0–5)

## 2019-09-01 VITALS — DIASTOLIC BLOOD PRESSURE: 70 MMHG | SYSTOLIC BLOOD PRESSURE: 103 MMHG

## 2019-09-01 VITALS — SYSTOLIC BLOOD PRESSURE: 112 MMHG | DIASTOLIC BLOOD PRESSURE: 69 MMHG

## 2019-09-01 VITALS — DIASTOLIC BLOOD PRESSURE: 66 MMHG | SYSTOLIC BLOOD PRESSURE: 120 MMHG

## 2019-09-01 VITALS — SYSTOLIC BLOOD PRESSURE: 115 MMHG | DIASTOLIC BLOOD PRESSURE: 67 MMHG

## 2019-09-01 VITALS — SYSTOLIC BLOOD PRESSURE: 115 MMHG | DIASTOLIC BLOOD PRESSURE: 76 MMHG

## 2019-09-01 VITALS — SYSTOLIC BLOOD PRESSURE: 103 MMHG | DIASTOLIC BLOOD PRESSURE: 54 MMHG

## 2019-09-01 VITALS — SYSTOLIC BLOOD PRESSURE: 110 MMHG | DIASTOLIC BLOOD PRESSURE: 65 MMHG

## 2019-09-01 VITALS — DIASTOLIC BLOOD PRESSURE: 69 MMHG | SYSTOLIC BLOOD PRESSURE: 127 MMHG

## 2019-09-01 VITALS — DIASTOLIC BLOOD PRESSURE: 71 MMHG | SYSTOLIC BLOOD PRESSURE: 123 MMHG

## 2019-09-01 VITALS — SYSTOLIC BLOOD PRESSURE: 112 MMHG | DIASTOLIC BLOOD PRESSURE: 70 MMHG

## 2019-09-01 VITALS — DIASTOLIC BLOOD PRESSURE: 70 MMHG | SYSTOLIC BLOOD PRESSURE: 112 MMHG

## 2019-09-01 VITALS — DIASTOLIC BLOOD PRESSURE: 70 MMHG | SYSTOLIC BLOOD PRESSURE: 120 MMHG

## 2019-09-01 VITALS — DIASTOLIC BLOOD PRESSURE: 71 MMHG | SYSTOLIC BLOOD PRESSURE: 110 MMHG

## 2019-09-01 VITALS — SYSTOLIC BLOOD PRESSURE: 123 MMHG | DIASTOLIC BLOOD PRESSURE: 57 MMHG

## 2019-09-01 VITALS — SYSTOLIC BLOOD PRESSURE: 99 MMHG | DIASTOLIC BLOOD PRESSURE: 76 MMHG

## 2019-09-01 VITALS — DIASTOLIC BLOOD PRESSURE: 66 MMHG | SYSTOLIC BLOOD PRESSURE: 112 MMHG

## 2019-09-01 VITALS — SYSTOLIC BLOOD PRESSURE: 113 MMHG | DIASTOLIC BLOOD PRESSURE: 61 MMHG

## 2019-09-01 VITALS — SYSTOLIC BLOOD PRESSURE: 110 MMHG | DIASTOLIC BLOOD PRESSURE: 60 MMHG

## 2019-09-01 VITALS — SYSTOLIC BLOOD PRESSURE: 111 MMHG | DIASTOLIC BLOOD PRESSURE: 63 MMHG

## 2019-09-01 VITALS — SYSTOLIC BLOOD PRESSURE: 115 MMHG | DIASTOLIC BLOOD PRESSURE: 60 MMHG

## 2019-09-01 VITALS — SYSTOLIC BLOOD PRESSURE: 104 MMHG | DIASTOLIC BLOOD PRESSURE: 67 MMHG

## 2019-09-01 VITALS — SYSTOLIC BLOOD PRESSURE: 122 MMHG | DIASTOLIC BLOOD PRESSURE: 65 MMHG

## 2019-09-01 VITALS — DIASTOLIC BLOOD PRESSURE: 69 MMHG | SYSTOLIC BLOOD PRESSURE: 126 MMHG

## 2019-09-01 LAB
ALBUMIN SERPL-MCNC: 2.6 G/DL (ref 3.4–5)
ALP SERPL-CCNC: 57 U/L (ref 46–116)
ALT SERPL-CCNC: 15 U/L (ref 10–68)
ANION GAP SERPL CALC-SCNC: 11.8 MMOL/L (ref 8–16)
BASOPHILS NFR BLD AUTO: 0.1 % (ref 0–2)
BILIRUB SERPL-MCNC: 0.41 MG/DL (ref 0.2–1.3)
BUN SERPL-MCNC: 26 MG/DL (ref 7–18)
CALCIUM SERPL-MCNC: 8 MG/DL (ref 8.5–10.1)
CHLORIDE SERPL-SCNC: 103 MMOL/L (ref 98–107)
CO2 SERPL-SCNC: 27.7 MMOL/L (ref 21–32)
CREAT SERPL-MCNC: 1 MG/DL (ref 0.6–1.3)
EOSINOPHIL NFR BLD: 0.5 % (ref 0–7)
ERYTHROCYTE [DISTWIDTH] IN BLOOD BY AUTOMATED COUNT: 14.8 % (ref 11.5–14.5)
GLOBULIN SER-MCNC: 3 G/L
GLUCOSE SERPL-MCNC: 166 MG/DL (ref 74–106)
HCT VFR BLD CALC: 27.8 % (ref 42–54)
HGB BLD-MCNC: 9.1 G/DL (ref 13.5–17.5)
IMM GRANULOCYTES NFR BLD: 0.3 % (ref 0–5)
LYMPHOCYTES NFR BLD AUTO: 11.8 % (ref 15–50)
MCH RBC QN AUTO: 28.4 PG (ref 26–34)
MCHC RBC AUTO-ENTMCNC: 32.7 G/DL (ref 31–37)
MCV RBC: 86.9 FL (ref 80–100)
MONOCYTES NFR BLD: 7.1 % (ref 2–11)
NEUTROPHILS NFR BLD AUTO: 80.2 % (ref 40–80)
OSMOLALITY SERPL CALC.SUM OF ELEC: 284 MOSM/KG (ref 275–300)
PLATELET # BLD: 163 10X3/UL (ref 130–400)
PMV BLD AUTO: 10.4 FL (ref 7.4–10.4)
POTASSIUM SERPL-SCNC: 4.5 MMOL/L (ref 3.5–5.1)
PROT SERPL-MCNC: 5.6 G/DL (ref 6.4–8.2)
RBC # BLD AUTO: 3.2 10X6/UL (ref 4.2–6.1)
SODIUM SERPL-SCNC: 138 MMOL/L (ref 136–145)
WBC # BLD AUTO: 12.9 10X3/UL (ref 4.8–10.8)

## 2019-09-02 VITALS — DIASTOLIC BLOOD PRESSURE: 65 MMHG | SYSTOLIC BLOOD PRESSURE: 128 MMHG

## 2019-09-02 VITALS — SYSTOLIC BLOOD PRESSURE: 132 MMHG | DIASTOLIC BLOOD PRESSURE: 73 MMHG

## 2019-09-02 VITALS — DIASTOLIC BLOOD PRESSURE: 62 MMHG | SYSTOLIC BLOOD PRESSURE: 110 MMHG

## 2019-09-02 VITALS — SYSTOLIC BLOOD PRESSURE: 127 MMHG | DIASTOLIC BLOOD PRESSURE: 64 MMHG

## 2019-09-02 VITALS — SYSTOLIC BLOOD PRESSURE: 132 MMHG | DIASTOLIC BLOOD PRESSURE: 83 MMHG

## 2019-09-02 VITALS — DIASTOLIC BLOOD PRESSURE: 55 MMHG | SYSTOLIC BLOOD PRESSURE: 136 MMHG

## 2019-09-02 VITALS — DIASTOLIC BLOOD PRESSURE: 66 MMHG | SYSTOLIC BLOOD PRESSURE: 118 MMHG

## 2019-09-02 VITALS — SYSTOLIC BLOOD PRESSURE: 132 MMHG | DIASTOLIC BLOOD PRESSURE: 69 MMHG

## 2019-09-02 VITALS — SYSTOLIC BLOOD PRESSURE: 104 MMHG | DIASTOLIC BLOOD PRESSURE: 64 MMHG

## 2019-09-02 VITALS — SYSTOLIC BLOOD PRESSURE: 111 MMHG | DIASTOLIC BLOOD PRESSURE: 58 MMHG

## 2019-09-02 VITALS — SYSTOLIC BLOOD PRESSURE: 133 MMHG | DIASTOLIC BLOOD PRESSURE: 75 MMHG

## 2019-09-02 VITALS — SYSTOLIC BLOOD PRESSURE: 125 MMHG | DIASTOLIC BLOOD PRESSURE: 63 MMHG

## 2019-09-02 VITALS — DIASTOLIC BLOOD PRESSURE: 70 MMHG | SYSTOLIC BLOOD PRESSURE: 126 MMHG

## 2019-09-02 VITALS — DIASTOLIC BLOOD PRESSURE: 53 MMHG | SYSTOLIC BLOOD PRESSURE: 139 MMHG

## 2019-09-02 VITALS — SYSTOLIC BLOOD PRESSURE: 106 MMHG | DIASTOLIC BLOOD PRESSURE: 70 MMHG

## 2019-09-02 VITALS — DIASTOLIC BLOOD PRESSURE: 59 MMHG | SYSTOLIC BLOOD PRESSURE: 107 MMHG

## 2019-09-02 VITALS — SYSTOLIC BLOOD PRESSURE: 135 MMHG | DIASTOLIC BLOOD PRESSURE: 55 MMHG

## 2019-09-02 VITALS — SYSTOLIC BLOOD PRESSURE: 124 MMHG | DIASTOLIC BLOOD PRESSURE: 55 MMHG

## 2019-09-02 VITALS — DIASTOLIC BLOOD PRESSURE: 70 MMHG | SYSTOLIC BLOOD PRESSURE: 106 MMHG

## 2019-09-02 VITALS — DIASTOLIC BLOOD PRESSURE: 76 MMHG | SYSTOLIC BLOOD PRESSURE: 108 MMHG

## 2019-09-02 VITALS — DIASTOLIC BLOOD PRESSURE: 64 MMHG | SYSTOLIC BLOOD PRESSURE: 130 MMHG

## 2019-09-02 VITALS — DIASTOLIC BLOOD PRESSURE: 62 MMHG | SYSTOLIC BLOOD PRESSURE: 116 MMHG

## 2019-09-02 VITALS — DIASTOLIC BLOOD PRESSURE: 78 MMHG | SYSTOLIC BLOOD PRESSURE: 133 MMHG

## 2019-09-02 LAB
ALBUMIN SERPL-MCNC: 2.4 G/DL (ref 3.4–5)
ALP SERPL-CCNC: 58 U/L (ref 46–116)
ALT SERPL-CCNC: 18 U/L (ref 10–68)
ANION GAP SERPL CALC-SCNC: 9.4 MMOL/L (ref 8–16)
BILIRUB SERPL-MCNC: 0.41 MG/DL (ref 0.2–1.3)
BUN SERPL-MCNC: 26 MG/DL (ref 7–18)
CALCIUM SERPL-MCNC: 8.1 MG/DL (ref 8.5–10.1)
CHLORIDE SERPL-SCNC: 105 MMOL/L (ref 98–107)
CO2 SERPL-SCNC: 28.1 MMOL/L (ref 21–32)
CREAT SERPL-MCNC: 1.1 MG/DL (ref 0.6–1.3)
ERYTHROCYTE [DISTWIDTH] IN BLOOD BY AUTOMATED COUNT: 14.5 % (ref 11.5–14.5)
GLOBULIN SER-MCNC: 3.3 G/L
GLUCOSE SERPL-MCNC: 101 MG/DL (ref 74–106)
HCT VFR BLD CALC: 25.8 % (ref 42–54)
HGB BLD-MCNC: 8.6 G/DL (ref 13.5–17.5)
MCH RBC QN AUTO: 28.5 PG (ref 26–34)
MCHC RBC AUTO-ENTMCNC: 33.3 G/DL (ref 31–37)
MCV RBC: 85.4 FL (ref 80–100)
OSMOLALITY SERPL CALC.SUM OF ELEC: 280 MOSM/KG (ref 275–300)
PLATELET # BLD: 145 10X3/UL (ref 130–400)
PMV BLD AUTO: 10.3 FL (ref 7.4–10.4)
POTASSIUM SERPL-SCNC: 4.5 MMOL/L (ref 3.5–5.1)
PROT SERPL-MCNC: 5.7 G/DL (ref 6.4–8.2)
RBC # BLD AUTO: 3.02 10X6/UL (ref 4.2–6.1)
SODIUM SERPL-SCNC: 138 MMOL/L (ref 136–145)
WBC # BLD AUTO: 8 10X3/UL (ref 4.8–10.8)

## 2019-09-02 NOTE — MORECARE
CASE MANAGEMENT DISCHARGE SUMMARY
 
 
PATIENT: LELA ORTIZ                       UNIT: [id number]
ACCOUNT#: [id number]                       ADM DATE: 19
AGE: 64     : [birth year]  SEX: M            ROOM/BED: D.[facility]    
AUTHOR: STACI,DOC                             PHYSICIAN:                               
 
REFERRING PHYSICIAN: ZACHARIAH RIGGINS MD          
DATE OF SERVICE: 19
Discharge Plan
 
 
Patient Name: LELA ORTIZ
Facility: [facility]:[place]
Encounter #: [id number]
Medical Record #: [id number]
: 1955
Planned Disposition: Home
Anticipated Discharge Date: 
 
Discharge Date: 
Expected LOS: 
Initial Reviewer: [email or web address]
Initial Review Date: 2019
Generated: 19   3:43 pm 
Comments
 
DCP- Discharge Planning
 
Updated by [id number]: Janet Vazquez on 19   1:41 pm CT
Patient Name: LELA ORTIZ                                     
Admission Status: Elective   
Accout number: [id number]                              
Admission Date: 2019   
: 1955                                                        
Admission Diagnosis:ATHSCL HEART DISEASE OF NATIVE COR ART W UNSP ANG PCTRS  
Attending: ZACHARIAH RIGGINS                                                
Current LOS:  5   
  
Anticipated DC Date:    
Planned Disposition: Home   
Primary Insurance: MEDICARE A & B   
  
  
Discharge Planning Comments: CM MET WITH PATIENT ABOUT DC PLANNING/NEEDS. 
STATES PLANS TO DC TO HOME WITH DAUGHTER. I DISCUSSED HH, REHAB AND EQUIPMENT 
NEEDS. DENIES ANY NEEDS AND STATES WILL DC TO HOME AND STAYING WITH HIS 
DAUGHTER. HE IS NOT ON 02 AND IS WALKING 300FT WITH 0% ASSIST AND NO DEVICE. 
CM TO FOLLOW AND ASSIST.   
 
  
  
  
  
  
  
: Janet Vazquez
DCP- Discharge Planning
 
Updated by [id number]: Malka Diaz on 19   7:40 pm CT
CM attempted to visit with patient regarding discharge planning/ needs. 
Patient currently on vent no family available. CM will continue to follow and 
assist as needed with discharge planning / needs
 DCPIA - Discharge Planning Initial Assessment
 
Updated by [id number]: Janet Vazquez on 19   2:38 pm
*  Is the patient Alert and Oriented?
Yes
*  PCP
[facility]
*  Ph[facility]
*  Preadmission Environment
Home with Family
*  ADLs
Independent
*  Equipment
None
*  List name and contact numbers for known caregivers / representatives who 
currently or will assist patient after discharge:
VINOD, DAUGHTER, [phone], [phone]
*  Verbal permission to speak to the caregivers and representatives has been 
obtained from the patient.
Yes
*  Community resources currently utilized
None
*  Additional services required to return to the preadmission environment?
No
*  Can the patient safely return to the preadmission environment?
Yes
*  Has this patient been hospitalized within the prior 30 days at any 
hospital?
No
 
 
 
 
 
 
Patient Name: LELA ORTIZ
 
Encounter #: [id number]
Page [id number]
 
 
 
 
 
Electronically Signed by GLENN SMALL on 19 at 1444
 
 
 
 
 
 
**All edits/amendments must be made on the electronic document**
 
DICTATION DATE: 19 1443     : FROYLAN  19 1443     
RPT#: [id number]                                DC DATE:        
                                               STATUS: ADM IN  
[facility]
 [place] [address]
***END OF REPORT***

## 2019-09-03 VITALS — DIASTOLIC BLOOD PRESSURE: 79 MMHG | SYSTOLIC BLOOD PRESSURE: 131 MMHG

## 2019-09-03 VITALS — DIASTOLIC BLOOD PRESSURE: 73 MMHG | SYSTOLIC BLOOD PRESSURE: 142 MMHG

## 2019-09-03 VITALS — SYSTOLIC BLOOD PRESSURE: 121 MMHG | DIASTOLIC BLOOD PRESSURE: 70 MMHG

## 2019-09-03 VITALS — DIASTOLIC BLOOD PRESSURE: 59 MMHG | SYSTOLIC BLOOD PRESSURE: 111 MMHG

## 2019-09-03 VITALS — DIASTOLIC BLOOD PRESSURE: 66 MMHG | SYSTOLIC BLOOD PRESSURE: 120 MMHG

## 2019-09-03 VITALS — DIASTOLIC BLOOD PRESSURE: 59 MMHG | SYSTOLIC BLOOD PRESSURE: 122 MMHG

## 2019-09-03 VITALS — DIASTOLIC BLOOD PRESSURE: 58 MMHG | SYSTOLIC BLOOD PRESSURE: 122 MMHG

## 2019-09-03 VITALS — DIASTOLIC BLOOD PRESSURE: 62 MMHG | SYSTOLIC BLOOD PRESSURE: 124 MMHG

## 2019-09-03 VITALS — DIASTOLIC BLOOD PRESSURE: 65 MMHG | SYSTOLIC BLOOD PRESSURE: 116 MMHG

## 2019-09-03 VITALS — DIASTOLIC BLOOD PRESSURE: 73 MMHG | SYSTOLIC BLOOD PRESSURE: 135 MMHG

## 2019-09-03 VITALS — SYSTOLIC BLOOD PRESSURE: 114 MMHG | DIASTOLIC BLOOD PRESSURE: 63 MMHG

## 2019-09-03 VITALS — DIASTOLIC BLOOD PRESSURE: 68 MMHG | SYSTOLIC BLOOD PRESSURE: 121 MMHG

## 2019-09-03 VITALS — DIASTOLIC BLOOD PRESSURE: 67 MMHG | SYSTOLIC BLOOD PRESSURE: 122 MMHG

## 2019-09-03 LAB
ALBUMIN SERPL-MCNC: 2.4 G/DL (ref 3.4–5)
ALP SERPL-CCNC: 56 U/L (ref 46–116)
ALT SERPL-CCNC: 21 U/L (ref 10–68)
ANION GAP SERPL CALC-SCNC: 11.9 MMOL/L (ref 8–16)
BILIRUB SERPL-MCNC: 0.38 MG/DL (ref 0.2–1.3)
BUN SERPL-MCNC: 26 MG/DL (ref 7–18)
CALCIUM SERPL-MCNC: 8 MG/DL (ref 8.5–10.1)
CHLORIDE SERPL-SCNC: 108 MMOL/L (ref 98–107)
CO2 SERPL-SCNC: 27.8 MMOL/L (ref 21–32)
CREAT SERPL-MCNC: 1.2 MG/DL (ref 0.6–1.3)
ERYTHROCYTE [DISTWIDTH] IN BLOOD BY AUTOMATED COUNT: 14.5 % (ref 11.5–14.5)
GLOBULIN SER-MCNC: 3 G/L
GLUCOSE SERPL-MCNC: 89 MG/DL (ref 74–106)
HCT VFR BLD CALC: 26.4 % (ref 42–54)
HGB BLD-MCNC: 8.7 G/DL (ref 13.5–17.5)
MCH RBC QN AUTO: 27.9 PG (ref 26–34)
MCHC RBC AUTO-ENTMCNC: 33 G/DL (ref 31–37)
MCV RBC: 84.6 FL (ref 80–100)
OSMOLALITY SERPL CALC.SUM OF ELEC: 288 MOSM/KG (ref 275–300)
PLATELET # BLD: 182 10X3/UL (ref 130–400)
PMV BLD AUTO: 10.4 FL (ref 7.4–10.4)
POTASSIUM SERPL-SCNC: 4.7 MMOL/L (ref 3.5–5.1)
PROT SERPL-MCNC: 5.4 G/DL (ref 6.4–8.2)
RBC # BLD AUTO: 3.12 10X6/UL (ref 4.2–6.1)
SODIUM SERPL-SCNC: 143 MMOL/L (ref 136–145)
WBC # BLD AUTO: 6.8 10X3/UL (ref 4.8–10.8)

## 2019-09-03 NOTE — MORECARE
CASE MANAGEMENT DISCHARGE SUMMARY
 
 
PATIENT: LELA ORTIZ                       UNIT: [id number]
ACCOUNT#: [id number]                       ADM DATE: 19
AGE: 64     : [birth year]  SEX: M            [facility]    
AUTHOR: STACI,DOC                             PHYSICIAN:                               
 
REFERRING PHYSICIAN: ZACHARIAH RIGGINS MD          
DATE OF SERVICE: 19
Discharge Plan
 
 
Patient Name: LELA ORTIZ
Facility: [facility]:[place]
Encounter #: [id number]
Medical Record #: [id number]
: 1955
Planned Disposition: Home
Anticipated Discharge Date: 
 
Discharge Date: 2019
Expected LOS: 
Initial Reviewer: [email or web address]
Initial Review Date: 2019
Generated: 9/3/19   5:13 pm 
Comments
 
DCP- Discharge Planning
 
Updated by [email or web address]: Janet Vazquez on 19   1:41 pm CT
Patient Name: LELA ORTIZ                                     
Admission Status: Elective   
Accout number: [id number]                              
Admission Date: 2019   
: 1955                                                        
Admission Diagnosis:ATHSCL HEART DISEASE OF NATIVE COR ART W UNSP ANG PCTRS  
Attending: ZACHARIAH RIGGINS                                                
Current LOS:  5   
  
Anticipated DC Date:    
Planned Disposition: Home   
Primary Insurance: MEDICARE A & B   
  
  
Discharge Planning Comments: CM MET WITH PATIENT ABOUT DC PLANNING/NEEDS. 
STATES PLANS TO DC TO HOME WITH DAUGHTER. I DISCUSSED HH, REHAB AND EQUIPMENT 
NEEDS. DENIES ANY NEEDS AND STATES WILL DC TO HOME AND STAYING WITH HIS 
DAUGHTER. HE IS NOT ON 02 AND IS WALKING 300FT WITH 0% ASSIST AND NO DEVICE. 
CM TO FOLLOW AND ASSIST.   
 
  
  
  
  
  
  
: Janet Vazquez
DCP- Discharge Planning
 
Updated by [id number]: Malka Diaz on 19   7:40 pm CT
CM attempted to visit with patient regarding discharge planning/ needs. 
Patient currently on vent no family available. CM will continue to follow and 
assist as needed with discharge planning / needs
 DCPIA - Discharge Planning Initial Assessment
 
Updated by [email or web address]: Janet Vazquez on 19   2:38 pm
*  Is the patient Alert and Oriented?
Yes
*  PCP
[facility]
*  Pharmacy
[facility] [address]
*  Preadmission Environment
Home with Family
*  ADLs
Independent
*  Equipment
None
*  List name and contact numbers for known caregivers / representatives who 
currently or will assist patient after discharge:
BRANDY BROCK, [phone], [phone]
*  Verbal permission to speak to the caregivers and representatives has been 
obtained from the patient.
Yes
*  Community resources currently utilized
None
*  Additional services required to return to the preadmission environment?
No
*  Can the patient safely return to the preadmission environment?
Yes
*  Has this patient been hospitalized within the prior 30 days at any 
hospital?
No
 
 
 
 
 
Coverage Notice
 
Reviewer: [email or web address] Sole Diaz
 
 
Notice Issued Date-Time: 2019  11:20
Notice Type: IM Discharge Notice
 
Notice Delivered To: Patient
Relationship to Patient: Self
Representative Name: 
 
Delivery Method: HAND - Hand Delivered
Bethany Days:
Prior Verbal Notification: 
 
Recipient Understood Notice: Yes
Recipient Signature: Yes
Med Rec Note Co-signed by Attending:
 
Coverage Notice Comment:  
 
Last DP export: 19   1:44 pm
Patient Name: LELA ORTIZ
Encounter #: [id number]
Page [id number]
 
 
 
 
 
Electronically Signed by GLENN SMALL on 19 at 1613
 
 
 
 
 
 
**All edits/amendments must be made on the electronic document**
 
DICTATION DATE: 19 161     : FROYLAN  19 1612     
RPT#: [id number]                                DC DATE:19
                                               STATUS: DIS IN  
[facility]
[address] [place] [address]
***END OF REPORT***

## 2019-09-11 NOTE — TEE
PATIENT:LELA ORTIZ                   MEDICAL RECORD: [id number]
                                               LOCATION:[place] [address]
AGE OF PATIENT: 64                             ADMISSION DATE: 08/28/19
SEX: M   
 
REFERRING PHYSICIAN:                                                             
 
INTERPRETING PHYSICIAN: CARI BARBOSA MD             

 
 
                         TRANSESOPHAGEAL ECHOCARDIOGRAM
 Date:              08/29/19      DORA CHARGE Y
                INDICATIONS: CABG                     
 
             PREMEDICATIONS:                               
 
PATIENT'S RESPONSE PROCEDURE                          
 
                     DOPPLER MEASUREMENTS:
            LVIT            LA           PA           RA      
            LVOT          RVOT      Asc. Ao      
AV Gradient Peak       AV Mean      AV Area      
MV Gradient Peak       MV Mean      MV Area      
 INTERPRETATION: LVd: 4.2 cm              
                 LVs: 2.4 cm              
                 RA: 4.7 cm               
                 LA: 3.7 cm               
 
 
               Doppler:                          
 
                   2-D:                          
 
     COLOR FLOW DOPPLER                          
 
   NORMAL SALINE STUDY:                     
 
          MISCELLANOUS:                          
 
             DIAGNOSIS:                          
 
                  PLAN:                          
 
           Cardiologist:Gem Blair             
   Cardiac Sonographer: Blanca MARTIN            
              COMMENTS:                                              
                                                                                     
 
 
DATE OF SERVICE:  08/29/2019
 
PROCEDURE:  Transesophageal echo evaluation of valvular structures during bypass
surgery.
 
FINDINGS:
1. Left ventricular chamber size is within normal limits.  Left ventricular
systolic function is normal at 50-55%.
 
 
 
TRANSESOPHAGEAL ECHOCARDIOGRAM REPORT                    [id number]    LELA ORTIZ 
 
 
2. Left atrium, right atrium, and right ventricular chamber sizes are within
normal limits.
3. Valvular structures have normal structure and motion.
4. Doppler interrogation reveals trace mitral regurgitation, no other valvular
insufficiency or stenosis.
 
TRANSINT:[id number] Voice Confirmation ID: [id number] DOCUMENT ID: [id number]
 
 
 
Electronically Signed by CARI BARBOSA on 09/11/19 at 1431
 
 
 
 
 
 
 
 
 
 
 
 
 
 
 
 
 
 
 
 
 
 
 
 
 
 
 
 
 
 
 
 
 
 
 
CC:                                                             [id number]
DICTATION DATE: 08/30/19 0941     :     08/30/19 2352      DIS IN  
                                                                      09/03/19
[address] [place] [address]
